# Patient Record
Sex: FEMALE | Race: ASIAN | NOT HISPANIC OR LATINO | Employment: FULL TIME | URBAN - METROPOLITAN AREA
[De-identification: names, ages, dates, MRNs, and addresses within clinical notes are randomized per-mention and may not be internally consistent; named-entity substitution may affect disease eponyms.]

---

## 2017-06-28 ENCOUNTER — TELEPHONE (OUTPATIENT)
Dept: MEDICAL GROUP | Age: 47
End: 2017-06-28

## 2017-06-28 NOTE — TELEPHONE ENCOUNTER
Called Martinez Lopez and left a message to return my call regarding his/her upcoming NP appointment with Jeanne Rivera M.D..   If patient returns the call please transfer them to extension: [phone]

## 2017-06-29 NOTE — TELEPHONE ENCOUNTER
Called Martinez Lopez and left a message to return my call regarding his/her upcoming NP appointment with Jeanne Rivera M.D..   If patient returns the call please transfer them to extension: [phone]

## 2017-07-05 ENCOUNTER — OFFICE VISIT (OUTPATIENT)
Dept: MEDICAL GROUP | Age: 47
End: 2017-07-05
Payer: COMMERCIAL

## 2017-07-05 VITALS
TEMPERATURE: 97.5 F | BODY MASS INDEX: 18.44 KG/M2 | DIASTOLIC BLOOD PRESSURE: 70 MMHG | SYSTOLIC BLOOD PRESSURE: 106 MMHG | HEIGHT: 64 IN | WEIGHT: 108 LBS | HEART RATE: 77 BPM | OXYGEN SATURATION: 95 %

## 2017-07-05 DIAGNOSIS — Z12.31 VISIT FOR SCREENING MAMMOGRAM: ICD-10-CM

## 2017-07-05 DIAGNOSIS — M62.838 TRAPEZIUS MUSCLE SPASM: ICD-10-CM

## 2017-07-05 DIAGNOSIS — H53.10 TIRED EYES: ICD-10-CM

## 2017-07-05 DIAGNOSIS — Z13.220 SCREENING FOR LIPID DISORDERS: ICD-10-CM

## 2017-07-05 PROCEDURE — 99204 OFFICE O/P NEW MOD 45 MIN: CPT | Performed by: INTERNAL MEDICINE

## 2017-07-05 RX ORDER — CYCLOBENZAPRINE HCL 5 MG
5 TABLET ORAL NIGHTLY PRN
Qty: 30 TAB | Refills: 0 | Status: SHIPPED | OUTPATIENT
Start: 2017-07-05 | End: 2020-09-26

## 2017-07-05 ASSESSMENT — PAIN SCALES - GENERAL: PAINLEVEL: 2=MINIMAL-SLIGHT

## 2017-07-05 ASSESSMENT — PATIENT HEALTH QUESTIONNAIRE - PHQ9: CLINICAL INTERPRETATION OF PHQ2 SCORE: 0

## 2017-07-05 NOTE — PROGRESS NOTES
Martinez Lopez is a 47 y.o. female here to establish care and the evaluation and management of:      HPI:    Trapezius muscle spasm  Patient reported tightness on the shoulder blade and back of the shoulders for a few months. She works as a  and always leans forward at work for cooking and prepare food. She reported dull aching pain and muscle tightness after noontime after work. Pain is 5 out of 10 in severity. She has not tried any medication yet.    Tired eyes  Patient reported feeling tired on both eyes. She has eye exam a few months ago and needs to wear reading glasses. She was told by eye doctor that she does not have glaucoma or cataracts. She feels eye tiredness from looking her cell phone. She does not have history of diabetes or history of eye surgery.     Current medicines (including changes today)  Current Outpatient Prescriptions   Medication Sig Dispense Refill   • cyclobenzaprine (FLEXERIL) 5 MG tablet Take 1 Tab by mouth at bedtime as needed. 30 Tab 0     No current facility-administered medications for this visit.     She  has no past medical history of Breast cancer (CMS-[facility]) or Cancer (CMS-[facility]).  She  has past surgical history that includes tubal coagulation laparoscopic bilateral ().  Social History   Substance Use Topics   • Smoking status: Never Smoker    • Smokeless tobacco: Never Used   • Alcohol Use: No     Social History     Social History Narrative     Family History   Problem Relation Age of Onset   • Lung Disease Mother    • Cancer Paternal Uncle 72     Throat cancer    • Cancer Maternal Grandmother 65     Breast cancer     Family Status   Relation Status Death Age   • Mother Alive    • Father Alive    • Sister Alive    • Brother Alive    • Paternal Uncle     • Maternal Grandmother     • Maternal Grandfather     • Paternal Grandmother     • Paternal Grandfather     • Brother Alive    • Brother Alive    • Sister Alive    • Brother Alive   "    Health Maintenance Topics with due status: Overdue       Topic Date Due    IMM DTaP/Tdap/Td Vaccine 02/23/1987    PAP SMEAR 02/23/1989    MAMMOGRAM 09/30/2014         ROS    Gen.: Denied weight change, appetite change, fatigue.  ENT: Denied sinus tenderness, nasal congestion, runny nose, or sore throat  CVS: Denied chest pain, palpitations, legs swelling.  Respiratory: Denied cough, shortness of breath, wheezing.  GI: Denied abdominal pain, constipation or diarrhea.  Endocrine: Denied temperature intolerance, increased frequency of urination, polyphagia or polydipsia.  Musculoskeletal: Denied back pain or joint pain.    All other systems reviewed and are negative     Objective:     Blood pressure 106/70, pulse 77, temperature 36.4 °C (97.5 °F), height 1.613 m (5' 3.5\"), weight 48.988 kg (108 lb), SpO2 95 %, not currently breastfeeding. Body mass index is 18.83 kg/(m^2).  Physical Exam:    Constitutional: Well nourished and Well developed, Alert, no distress.  Skin: Warm, dry, good turgor, no rashes in visible areas.  Eye: Equal, round and reactive, conjunctiva clear, lids normal.  ENMT: Lips without lesions, good dentition, oropharynx clear.  Neck: Trachea midline, no masses, no thyromegaly. No cervical or supraclavicular lymphadenopathy.  Respiratory: Unlabored respiratory effort, lungs clear to auscultation, no wheezes, no ronchi.  Cardiovascular: Normal S1, S2, no murmur, no edema.   Abdomen: Soft, non distended, non-tender, no masses, no hepatosplenomegaly. Bowel sound normal.  Extremities: No edema, no clubbing, no cyanosis.  Psych: Alert and oriented x3, normal affect and mood.  Musculoskeletal exam: Mild tenderness and tightness on Bilateral trapezius muscle and parascapular regions.        Assessment and Plan:   The following treatment plan was discussed       1. Tired eyes  - Discussed to avoid looking small printing. Advised to wear a prescription reading glasses as instructed by her eye doctor.  - " Discussed to try PreserVision supplements for eyes and Refresh lubricant eyedrops for dry eyes.  - CBC WITH DIFFERENTIAL; Future  - COMP METABOLIC PANEL; Future    2. Trapezius muscle spasm  - Has long discussion of possible causes with patient and family. Advised to try stretching exercises on neck, shoulders and arm.  - Advised to try heat therapy and TENS units for massage.  - She can try over the counter low dose ibuprofen 200 mg twice a day when necessary with food.  - Advised patient to stop taking ibuprofen or any NSAIDs if she has abdominal pain, black tarry stool or bloody stool.  - Prescribed Flexeril 5 mg daily at bedtime when necessary for muscle spasm and tightness.  - Advised to be cautious for sedation effect of Flexeril and recommend not to take it with alcohol or other sedating medications. Recommend to avoid driving or operating heavy machinery when she feels drowsy or sleepy from taking medication.  - CBC WITH DIFFERENTIAL; Future  - COMP METABOLIC PANEL; Future  - cyclobenzaprine (FLEXERIL) 5 MG tablet; Take 1 Tab by mouth at bedtime as needed.  Dispense: 30 Tab; Refill: 0    3. Screening for lipid disorders  - Advised to eat low fat, low carbohydrate and high fiber diet as well as do cardio physical exercise regularly.   - LIPID PROFILE; Future    4. Visit for screening mammogram  - Order screening mammogram. Patient stated that her maternal grandmom has breast cancer at age 65. Last mammogram in 2013 was normal.  - MA-SCREEN MAMMO W/CAD-BILAT; Future        Records requested.  Followup: Return in about 6 weeks (around 8/16/2017), or if symptoms worsen or fail to improve, for Pap, lab review. sooner should new symptoms or problems arise.      Please note that this dictation was created using voice recognition software. I have made every reasonable attempt to correct obvious errors, but I expect that there may have unintended errors in text, spelling, punctuation, or grammar that I did not  discover.

## 2017-07-05 NOTE — ASSESSMENT & PLAN NOTE
Patient reported tightness on the shoulder blade and back of the shoulders for a few months. She works as a  and always leans forward at work for cooking and prepare food. She reported dull aching pain and muscle tightness after noontime after work. Pain is 5 out of 10 in severity. She has not tried any medication yet.

## 2017-07-05 NOTE — MR AVS SNAPSHOT
"Martinez Lopez   2017 11:00 AM   Office Visit   MRN: [id number]    Department:  [address] [place] [address]   Dept Phone:  [phone]    Description:  Female : 1970   Provider:  Jeanne Rivera M.D.           Reason for Visit     Establish Care           Allergies as of 2017     No Known Allergies      You were diagnosed with     Tired eyes   [233687]       Trapezius muscle spasm   [[id number]]       Screening for lipid disorders   [1885805]       Visit for screening mammogram   [[id number]]         Vital Signs     Blood Pressure Pulse Temperature Height Weight Body Mass Index    106/70 mmHg 77 36.4 °C (97.5 °F) 1.613 m (5' 3.5\") 48.988 kg (108 lb) 18.83 kg/m2    Oxygen Saturation Breastfeeding? Smoking Status             95% No Never Smoker          Basic Information     Date Of Birth Sex Race Ethnicity Preferred Language    1970 Female Unknown Unknown Chinese      Your appointments     Aug 16, 2017  1:00 PM   Established Patient with Jeanne Rivera M.D.   [address] [facility]  [facility] [id number] [phone]           You will be receiving a confirmation call a few days before your appointment from our automated call confirmation system.              Problem List              ICD-10-CM Priority Class Noted - Resolved    Tired eyes H53.10   2017 - Present    Trapezius muscle spasm M62.838   2017 - Present      Health Maintenance        Date Due Completion Dates    IMM DTaP/Tdap/Td Vaccine (1 - Tdap) 1987 ---    PAP SMEAR 1989 ---    MAMMOGRAM 2014    IMM INFLUENZA (1) 2014, 2011            Current Immunizations     Influenza Vaccine Quad Inj (Pf) 2011    Influenza Vaccine Quad Inj (Preserved) 2014      Below and/or attached are the medications your provider expects you to take. Review all of your home medications and newly ordered medications with your provider and/or pharmacist. Follow " medication instructions as directed by your provider and/or pharmacist. Please keep your medication list with you and share with your provider. Update the information when medications are discontinued, doses are changed, or new medications (including over-the-counter products) are added; and carry medication information at all times in the event of emergency situations     Allergies:  No Known Allergies          Medications  Valid as of: July 05, 2017 - 11:52 AM    Generic Name Brand Name Tablet Size Instructions for use    Cyclobenzaprine HCl (Tab) FLEXERIL 5 MG Take 1 Tab by mouth at bedtime as needed.        .                 Medicines prescribed today were sent to:     [facility] [address] [facility] ([place]), [place] - [address]    [facility] [facility] [facility] [place] [address]    Phone: [phone] Fax: [phone]    Open 24 Hours?: No      Medication refill instructions:       If your prescription bottle indicates you have medication refills left, it is not necessary to call your provider’s office. Please contact your pharmacy and they will refill your medication.    If your prescription bottle indicates you do not have any refills left, you may request refills at any time through one of the following ways: The online [email or web address] system (except Urgent Care), by calling your provider’s office, or by asking your pharmacy to contact your provider’s office with a refill request. Medication refills are processed only during regular business hours and may not be available until the next business day. Your provider may request additional information or to have a follow-up visit with you prior to refilling your medication.   *Please Note: Medication refills are assigned a new Rx number when refilled electronically. Your pharmacy may indicate that no refills were authorized even though a new prescription for the same medication is available at the pharmacy. Please request the medicine by name with the pharmacy before contacting your  provider for a refill.        Your To Do List     Future Labs/Procedures Complete By Expires    CBC WITH DIFFERENTIAL  As directed 7/6/2018    COMP METABOLIC PANEL  As directed 7/6/2018    LIPID PROFILE  As directed 7/6/2018    MA-SCREEN MAMMO W/CAD-BILAT  As directed 8/6/2018         [email or web address] Access Code: [email or web address]  Expires: 8/4/2017 10:22 AM    [email or web address]  A secure, online tool to manage your health information     [employer]’s [email or web address]® is a secure, online tool that connects you to your personalized health information from the privacy of your home -- day or night - making it very easy for you to manage your healthcare. Once the activation process is completed, you can even access your medical information using the [email or web address] anahi, which is available for free in the Apple Anahi store or Google Play store.     [employer] [email or web address] provides the following levels of access (as shown below):   My Chart Features   [facility] Doctor [facility]  Specialists [facility]  Urgent  Care Non-[facility]  Primary Care  Doctor   Email your healthcare team securely and privately 24/7 X X X    Manage appointments: schedule your next appointment; view details of past/upcoming appointments X      Request prescription refills. X      View recent personal medical records, including lab and immunizations X X X X   View health record, including health history, allergies, medications X X X X   Read reports about your outpatient visits, procedures, consult and ER notes X X X X   See your discharge summary, which is a recap of your hospital and/or ER visit that includes your diagnosis, lab results, and care plan. X X       How to register for [email or web address]:  1. Go to  [email or web address].  2. Click on the Sign Up Now box, which takes you to the New Member Sign Up page. You will need to provide the following information:  a. Enter your [employer] [email or web address] Access Code exactly as it appears at the top of this page. (You will not need to use this code after you’ve  completed the sign-up process. If you do not sign up before the expiration date, you must request a new code.)   b. Enter your date of birth.   c. Enter your home email address.   d. Click Submit, and follow the next screen’s instructions.  3. Create a vocaltapt ID. This will be your vocaltapt login ID and cannot be changed, so think of one that is secure and easy to remember.  4. Create a [employer] password. You can change your password at any time.  5. Enter your Password Reset Question and Answer. This can be used at a later time if you forget your password.   6. Enter your e-mail address. This allows you to receive e-mail notifications when new information is available in [employer].  7. Click Sign Up. You can now view your health information.    For assistance activating your [employer] account, call [phone]

## 2017-07-05 NOTE — ASSESSMENT & PLAN NOTE
Patient reported feeling tired on both eyes. She has eye exam a few months ago and needs to wear reading glasses. She was told by eye doctor that she does not have glaucoma or cataracts. She feels eye tiredness from looking her cell phone. She does not have history of diabetes or history of eye surgery.

## 2017-08-05 ENCOUNTER — HOSPITAL ENCOUNTER (OUTPATIENT)
Dept: LAB | Facility: MEDICAL CENTER | Age: 47
End: 2017-08-05
Attending: INTERNAL MEDICINE
Payer: COMMERCIAL

## 2017-08-05 DIAGNOSIS — M62.838 TRAPEZIUS MUSCLE SPASM: ICD-10-CM

## 2017-08-05 DIAGNOSIS — H53.10 TIRED EYES: ICD-10-CM

## 2017-08-05 DIAGNOSIS — Z13.220 SCREENING FOR LIPID DISORDERS: ICD-10-CM

## 2017-08-05 LAB
ALBUMIN SERPL BCP-MCNC: 3.8 G/DL (ref 3.2–4.9)
ALBUMIN/GLOB SERPL: 1.2 G/DL
ALP SERPL-CCNC: 33 U/L (ref 30–99)
ALT SERPL-CCNC: 9 U/L (ref 2–50)
ANION GAP SERPL CALC-SCNC: 5 MMOL/L (ref 0–11.9)
AST SERPL-CCNC: 15 U/L (ref 12–45)
BASOPHILS # BLD AUTO: 0.7 % (ref 0–1.8)
BASOPHILS # BLD: 0.04 K/UL (ref 0–0.12)
BILIRUB SERPL-MCNC: 1.2 MG/DL (ref 0.1–1.5)
BUN SERPL-MCNC: 15 MG/DL (ref 8–22)
CALCIUM SERPL-MCNC: 9.2 MG/DL (ref 8.5–10.5)
CHLORIDE SERPL-SCNC: 104 MMOL/L (ref 96–112)
CHOLEST SERPL-MCNC: 162 MG/DL (ref 100–199)
CO2 SERPL-SCNC: 28 MMOL/L (ref 20–33)
CREAT SERPL-MCNC: 0.61 MG/DL (ref 0.5–1.4)
EOSINOPHIL # BLD AUTO: 0.13 K/UL (ref 0–0.51)
EOSINOPHIL NFR BLD: 2.4 % (ref 0–6.9)
ERYTHROCYTE [DISTWIDTH] IN BLOOD BY AUTOMATED COUNT: 41.2 FL (ref 35.9–50)
GFR SERPL CREATININE-BSD FRML MDRD: >60 ML/MIN/1.73 M 2
GLOBULIN SER CALC-MCNC: 3.2 G/DL (ref 1.9–3.5)
GLUCOSE SERPL-MCNC: 71 MG/DL (ref 65–99)
HCT VFR BLD AUTO: 41.2 % (ref 37–47)
HDLC SERPL-MCNC: 57 MG/DL
HGB BLD-MCNC: 13.4 G/DL (ref 12–16)
IMM GRANULOCYTES # BLD AUTO: 0.03 K/UL (ref 0–0.11)
IMM GRANULOCYTES NFR BLD AUTO: 0.6 % (ref 0–0.9)
LDLC SERPL CALC-MCNC: 93 MG/DL
LYMPHOCYTES # BLD AUTO: 1.74 K/UL (ref 1–4.8)
LYMPHOCYTES NFR BLD: 32.5 % (ref 22–41)
MCH RBC QN AUTO: 30 PG (ref 27–33)
MCHC RBC AUTO-ENTMCNC: 32.5 G/DL (ref 33.6–35)
MCV RBC AUTO: 92.4 FL (ref 81.4–97.8)
MONOCYTES # BLD AUTO: 0.52 K/UL (ref 0–0.85)
MONOCYTES NFR BLD AUTO: 9.7 % (ref 0–13.4)
NEUTROPHILS # BLD AUTO: 2.89 K/UL (ref 2–7.15)
NEUTROPHILS NFR BLD: 54.1 % (ref 44–72)
NRBC # BLD AUTO: 0 K/UL
NRBC BLD AUTO-RTO: 0 /100 WBC
PLATELET # BLD AUTO: 189 K/UL (ref 164–446)
PMV BLD AUTO: 10.3 FL (ref 9–12.9)
POTASSIUM SERPL-SCNC: 3.7 MMOL/L (ref 3.6–5.5)
PROT SERPL-MCNC: 7 G/DL (ref 6–8.2)
RBC # BLD AUTO: 4.46 M/UL (ref 4.2–5.4)
SODIUM SERPL-SCNC: 137 MMOL/L (ref 135–145)
TRIGL SERPL-MCNC: 60 MG/DL (ref 0–149)
WBC # BLD AUTO: 5.4 K/UL (ref 4.8–10.8)

## 2017-08-05 PROCEDURE — 85025 COMPLETE CBC W/AUTO DIFF WBC: CPT

## 2017-08-05 PROCEDURE — 80061 LIPID PANEL: CPT

## 2017-08-05 PROCEDURE — 36415 COLL VENOUS BLD VENIPUNCTURE: CPT

## 2017-08-05 PROCEDURE — 80053 COMPREHEN METABOLIC PANEL: CPT

## 2017-08-15 ENCOUNTER — TELEPHONE (OUTPATIENT)
Dept: MEDICAL GROUP | Age: 47
End: 2017-08-15

## 2017-08-15 NOTE — TELEPHONE ENCOUNTER
ESTABLISHED PATIENT PRE-VISIT PLANNING     Note: Patient will not be contacted if there is no indication to call.     1.  Reviewed notes from the last few office visits within the medical group: Yes    2.  If any orders were placed at last visit or intended to be done for this visit (i.e. 6 mos follow-up), do we have Results/Consult Notes?        •  Labs - Labs ordered, completed and results are in chart.       •  Imaging - Imaging was not ordered at last office visit.       •  Referrals - No referrals were ordered at last office visit.    3. Is this appointment scheduled as a Hospital Follow-Up? No    4.  Immunizations were updated in [employer] using WebIZ?: Yes       •  Web Iz Recommendations: FLU, MMR , TDAP and VARICELLA (Chicken Pox)     5.  Patient is due for the following Health Maintenance Topics:   Health Maintenance Due   Topic Date Due   • IMM DTaP/Tdap/Td Vaccine (1 - Tdap) 02/23/1987   • PAP SMEAR  02/23/1989   • MAMMOGRAM  09/30/2014           6.  Patient was NOT informed to arrive 15 min prior to their scheduled appointment and bring in their medication bottles.

## 2017-08-16 ENCOUNTER — HOSPITAL ENCOUNTER (OUTPATIENT)
Facility: MEDICAL CENTER | Age: 47
End: 2017-08-16
Attending: INTERNAL MEDICINE
Payer: COMMERCIAL

## 2017-08-16 ENCOUNTER — OFFICE VISIT (OUTPATIENT)
Dept: MEDICAL GROUP | Age: 47
End: 2017-08-16
Payer: COMMERCIAL

## 2017-08-16 VITALS
HEART RATE: 89 BPM | OXYGEN SATURATION: 98 % | TEMPERATURE: 97.7 F | HEIGHT: 64 IN | BODY MASS INDEX: 18.78 KG/M2 | WEIGHT: 110 LBS | SYSTOLIC BLOOD PRESSURE: 106 MMHG | DIASTOLIC BLOOD PRESSURE: 68 MMHG

## 2017-08-16 DIAGNOSIS — Z12.4 SCREENING FOR CERVICAL CANCER: ICD-10-CM

## 2017-08-16 DIAGNOSIS — Z01.419 ENCOUNTER FOR GYNECOLOGICAL EXAMINATION: ICD-10-CM

## 2017-08-16 PROCEDURE — 87624 HPV HI-RISK TYP POOLED RSLT: CPT

## 2017-08-16 PROCEDURE — 99396 PREV VISIT EST AGE 40-64: CPT | Performed by: INTERNAL MEDICINE

## 2017-08-16 PROCEDURE — 88175 CYTOPATH C/V AUTO FLUID REDO: CPT

## 2017-08-16 ASSESSMENT — PAIN SCALES - GENERAL: PAINLEVEL: NO PAIN

## 2017-08-16 NOTE — MR AVS SNAPSHOT
"        Martinez John   2017 1:00 PM   Office Visit   MRN: [id number]    Department:  [address] [place] [address]   Dept Phone:  [phone]    Description:  Female : 1970   Provider:  Jeanne Rivera M.D.           Reason for Visit     Gynecologic Exam pap      Allergies as of 2017     No Known Allergies      You were diagnosed with     Encounter for gynecological examination   [[id number]]   Counseled for healthy diet and physical exercise, monthly self breast exam and anual screening mammogram, take vitamin D and calcium daily.    Screening for cervical cancer   [[id number]]   Obtained pap smear and sent to lab. Will advise for pap result.       Vital Signs     Blood Pressure Pulse Temperature Height Weight Body Mass Index    106/68 mmHg 89 36.5 °C (97.7 °F) 1.613 m (5' 3.5\") 49.896 kg (110 lb) 19.18 kg/m2    Oxygen Saturation Breastfeeding? Smoking Status             98% No Never Smoker          Basic Information     Date Of Birth Sex Race Ethnicity Preferred Language    1970 Female  Non- Chinese      Problem List              ICD-10-CM Priority Class Noted - Resolved    Tired eyes H53.10   2017 - Present    Trapezius muscle spasm M62.838   2017 - Present      Health Maintenance        Date Due Completion Dates    IMM DTaP/Tdap/Td Vaccine (1 - Tdap) 1987 ---    PAP SMEAR 1989 ---    MAMMOGRAM 2014    IMM INFLUENZA (1) 2014, 2011            Current Immunizations     Influenza Vaccine Quad Inj (Pf) 2011    Influenza Vaccine Quad Inj (Preserved) 2014      Below and/or attached are the medications your provider expects you to take. Review all of your home medications and newly ordered medications with your provider and/or pharmacist. Follow medication instructions as directed by your provider and/or pharmacist. Please keep your medication list with you and share with your provider. Update the information when medications are " discontinued, doses are changed, or new medications (including over-the-counter products) are added; and carry medication information at all times in the event of emergency situations     Allergies:  No Known Allergies          Medications  Valid as of: August 16, 2017 -  5:02 PM    Generic Name Brand Name Tablet Size Instructions for use    Cyclobenzaprine HCl (Tab) FLEXERIL 5 MG Take 1 Tab by mouth at bedtime as needed.        .                 Medicines prescribed today were sent to:     [address] ([place]), [place] [address] [place] [id number] [facility] [place] [address]    Phone: [phone] Fax: [phone]    Open 24 Hours?: No      Medication refill instructions:       If your prescription bottle indicates you have medication refills left, it is not necessary to call your provider’s office. Please contact your pharmacy and they will refill your medication.    If your prescription bottle indicates you do not have any refills left, you may request refills at any time through one of the following ways: The online [employer] [place] system (except Urgent Care), by calling your provider’s office, or by asking your pharmacy to contact your provider’s office with a refill request. Medication refills are processed only during regular business hours and may not be available until the next business day. Your provider may request additional information or to have a follow-up visit with you prior to refilling your medication.   *Please Note: Medication refills are assigned a new Rx number when refilled electronically. Your pharmacy may indicate that no refills were authorized even though a new prescription for the same medication is available at the pharmacy. Please request the medicine by name with the pharmacy before contacting your provider for a refill.        Your To Do List     Future Labs/Procedures Complete By Expires    THINPREP PAP WITH HPV  As directed 8/17/2018         [employer] [place] Access Code: Activation code  not generated  Current MyChart Status: Active

## 2017-08-16 NOTE — PROGRESS NOTES
SUBJECTIVE: 47 y.o. female for annual routine gynecologic exam  Chief Complaint   Patient presents with   • Gynecologic Exam     pap       Obstetric History     No data available      Last Pap: 3-5 years ago.  History   Sexual Activity   • Sexual Activity: Not on file     Sexual history: currently sexually active, single partner   H/O Abnormal Pap no  She  reports that she has never smoked. She has never used smokeless tobacco.        Allergies: Review of patient's allergies indicates no known allergies.     ROS:    Reports none menopause symptoms of hot flashes, night sweats, sleep disruption, mood changes.Denies vaginal dryness.   Menses every month with 4 days light bleeding.  Cramping is none.   She does not take OTC analgesics for cramping  No significant bloating/fluid retention, pelvic pain, or dyspareunia. No vaginal discharge   No breast tenderness, mass, nipple discharge, changes in size or contour, or abnormal cyclic discomfort.  No urinary tract symptoms, no incontinence.   No abdominal pain, change in bowel habits, black or bloody stools.    No unusual headaches, no visual changes, menstrual migraines   No prolonged cough. No dyspnea or chest pain on exertion.  No depression, labile mood, anxiety, libido changes, insomnia.  No polydipsia, polyuria, temperature intolerance.  No new/concerning skin lesions, concerns.     Exercise: no regular exercise program  Preventive Care: Mammogram was ordered on 7/5/17 and she has not done it yet.     Current medicines (including changes today)  Current Outpatient Prescriptions   Medication Sig Dispense Refill   • cyclobenzaprine (FLEXERIL) 5 MG tablet Take 1 Tab by mouth at bedtime as needed. 30 Tab 0     No current facility-administered medications for this visit.     She  has no past medical history of Breast cancer (CMS-[facility]) or Cancer (CMS-[facility]).  She  has past surgical history that includes tubal coagulation laparoscopic bilateral (1994).     Family History:  "  Family History   Problem Relation Age of Onset   • Lung Disease Mother    • Cancer Paternal Uncle 72     Throat cancer    • Cancer Maternal Grandmother 65     Breast cancer          OBJECTIVE:   /68 mmHg  Pulse 89  Temp(Src) 36.5 °C (97.7 °F)  Ht 1.613 m (5' 3.5\")  Wt 49.896 kg (110 lb)  BMI 19.18 kg/m2  SpO2 98%  Breastfeeding? No  Body mass index is 19.18 kg/(m^2).    HEAD AND NECK:  Ears normal.  Throat, oral cavity and tongue normal.  Neck supple. No adenopathy or masses in the neck or supraclavicular regions.  No carotid bruits. No thyromegaly. NEURO: Cranial nerves are normal. DTR's normal and symmetric.  CHEST:  Clear, good air entry, no wheezes or rales. HEART:  Regular rate and rhythm.  S1 and S2 normal. No edema or JVD. ABDOMEN:  Soft without tenderness, guarding, mass or organomegaly.  No CVA tenderness or inguinal adenopathy. EXTREMITIES:  Extremities, reflexes and peripheral pulses are normal. SKIN: color normal, vascularity normal, no edema, temperature normal   No rashes or suspicious skin lesions noted.     Breast Exam: Performed with instruction during examination. No axillary lymphadenopathy, no skin changes, no dominant masses. No nipple retraction  Pelvic Exam -  Normal external genitalia with no lesions. Normal vaginal mucosa with normal rugation and no discharge. Cervix with no visible lesions. No cervical motion tenderness. Uterus is normal sized with no masses. No adnexal tenderness or enlargement appreciated. Thin Prep Pap is obtained, vaginal swab is not obtained and specimen(s) sent to lab  Rectal: deferred    Reviewed lab with patient and family today.    Results for CATRINA VALLEJO (MRN [id number]) as of 8/16/2017 16:57   Ref. Range 8/5/2017 08:49   WBC Latest Ref Range: 4.8-10.8 K/uL 5.4   RBC Latest Ref Range: 4.20-5.40 M/uL 4.46   Hemoglobin Latest Ref Range: 12.0-16.0 g/dL 13.4   Hematocrit Latest Ref Range: 37.0-47.0 % 41.2   MCV Latest Ref Range: 81.4-97.8 fL 92.4   MCH " Latest Ref Range: 27.0-33.0 pg 30.0   MCHC Latest Ref Range: 33.6-35.0 g/dL 32.5 (L)   RDW Latest Ref Range: 35.9-50.0 fL 41.2   Platelet Count Latest Ref Range: 164-446 K/uL 189   MPV Latest Ref Range: 9.0-12.9 fL 10.3   Neutrophils-Polys Latest Ref Range: 44.00-72.00 % 54.10   Neutrophils (Absolute) Latest Ref Range: 2.00-7.15 K/uL 2.89   Lymphocytes Latest Ref Range: 22.00-41.00 % 32.50   Lymphs (Absolute) Latest Ref Range: 1.00-4.80 K/uL 1.74   Monocytes Latest Ref Range: 0.00-13.40 % 9.70   Monos (Absolute) Latest Ref Range: 0.00-0.85 K/uL 0.52   Eosinophils Latest Ref Range: 0.00-6.90 % 2.40   Eos (Absolute) Latest Ref Range: 0.00-0.51 K/uL 0.13   Basophils Latest Ref Range: 0.00-1.80 % 0.70   Baso (Absolute) Latest Ref Range: 0.00-0.12 K/uL 0.04   Immature Granulocytes Latest Ref Range: 0.00-0.90 % 0.60   Immature Granulocytes (abs) Latest Ref Range: 0.00-0.11 K/uL 0.03   Nucleated RBC Latest Units: /100 WBC 0.00   NRBC (Absolute) Latest Units: K/uL 0.00   Sodium Latest Ref Range: 135-145 mmol/L 137   Potassium Latest Ref Range: 3.6-5.5 mmol/L 3.7   Chloride Latest Ref Range:  mmol/L 104   Co2 Latest Ref Range: 20-33 mmol/L 28   Anion Gap Latest Ref Range: 0.0-11.9  5.0   Glucose Latest Ref Range: 65-99 mg/dL 71   Bun Latest Ref Range: 8-22 mg/dL 15   Creatinine Latest Ref Range: 0.50-1.40 mg/dL 0.61   GFR If  Latest Ref Range: >60 mL/min/1.73 m 2 >60   GFR If Non  Latest Ref Range: >60 mL/min/1.73 m 2 >60   Calcium Latest Ref Range: 8.5-10.5 mg/dL 9.2   AST(SGOT) Latest Ref Range: 12-45 U/L 15   ALT(SGPT) Latest Ref Range: 2-50 U/L 9   Alkaline Phosphatase Latest Ref Range: 30-99 U/L 33   Total Bilirubin Latest Ref Range: 0.1-1.5 mg/dL 1.2   Albumin Latest Ref Range: 3.2-4.9 g/dL 3.8   Total Protein Latest Ref Range: 6.0-8.2 g/dL 7.0   Globulin Latest Ref Range: 1.9-3.5 g/dL 3.2   A-G Ratio Latest Units: g/dL 1.2   Cholesterol,Tot Latest Ref Range: 100-199 mg/dL 162    Triglycerides Latest Ref Range: 0-149 mg/dL 60   HDL Latest Ref Range: >=40 mg/dL 57   LDL Latest Ref Range: <100 mg/dL 93         <ASSESSMENT and PLAN>  1. Encounter for gynecological examination      Counseled for healthy diet and physical exercise, monthly self breast exam and anual screening mammogram, take vitamin D and calcium daily.   2. Screening for cervical cancer  THINPREP PAP WITH HPV    Obtained pap smear and sent to lab. Will advise for pap result.        Discussed  breast self exam, mammography screening, adequate intake of calcium and vitamin D, diet and exercise   Follow-up in 1 years for next Gyn exam and 3 years for next Pap.   Next office visit for recheck of chronic medical conditions is due in  1 year.

## 2017-08-18 LAB
CYTOLOGY REG CYTOL: NORMAL
HPV HR 12 DNA CVX QL NAA+PROBE: NEGATIVE
HPV16 DNA SPEC QL NAA+PROBE: NEGATIVE
HPV18 DNA SPEC QL NAA+PROBE: NEGATIVE
SPECIMEN SOURCE: NORMAL

## 2017-08-21 ENCOUNTER — TELEPHONE (OUTPATIENT)
Dept: MEDICAL GROUP | Age: 47
End: 2017-08-21

## 2017-08-21 NOTE — Clinical Note
August 23, 2017        Martinez Lopez  [address] [facility] [address]        Dear Martinez:    Dr. Rivera's office has been unable to reach you. Please call our office at [phone]. Thank you.      If you have any questions or concerns, please don't hesitate to call.        Sincerely,        Pato Trevizo Ass't      Electronically Signed

## 2017-08-21 NOTE — TELEPHONE ENCOUNTER
Phone Number Called: [phone] (home)     Message: Tried to call patient's son, got a busy tone. Will try again later.    Left Message for patient to call back: N\A

## 2017-08-21 NOTE — TELEPHONE ENCOUNTER
----- Message from Jeanne Rivera M.D. sent at 8/18/2017 10:56 PM PDT -----  Addendum to previous result note. Patient has normal pap smear and negative for HPV, but we did not do gonorrhea and chlamydia screening tests as she is low risk for STD. Please delete the last sentence of previous comment on result note.     Thanks!  Jeanne Rivera M.D.

## 2017-08-22 NOTE — TELEPHONE ENCOUNTER
Phone Number Called: [phone] (home)     Message: Left message for the patient to call us back regarding the note below.    Left Message for patient to call back: yes

## 2017-08-23 NOTE — TELEPHONE ENCOUNTER
Phone Number Called: [phone] (home)     Message: Left message for the patient to call us back regarding the note below. 3rd attempt. Letter mailed to the pt.    Left Message for patient to call back: yes

## 2020-05-20 ENCOUNTER — HOSPITAL ENCOUNTER (OUTPATIENT)
Facility: MEDICAL CENTER | Age: 50
End: 2020-05-20
Payer: COMMERCIAL

## 2020-05-20 LAB — AMBIGUOUS DTTM AMBI4: NORMAL

## 2020-05-23 LAB
SARS-COV-2 RNA SPEC QL NAA+PROBE: NOT DETECTED
SPECIMEN SOURCE: NORMAL

## 2020-09-26 ENCOUNTER — OFFICE VISIT (OUTPATIENT)
Dept: URGENT CARE | Facility: CLINIC | Age: 50
End: 2020-09-26
Payer: COMMERCIAL

## 2020-09-26 ENCOUNTER — HOSPITAL ENCOUNTER (EMERGENCY)
Facility: MEDICAL CENTER | Age: 50
End: 2020-09-26
Payer: COMMERCIAL

## 2020-09-26 VITALS
OXYGEN SATURATION: 96 % | WEIGHT: 103 LBS | HEIGHT: 64 IN | SYSTOLIC BLOOD PRESSURE: 110 MMHG | RESPIRATION RATE: 12 BRPM | BODY MASS INDEX: 17.58 KG/M2 | DIASTOLIC BLOOD PRESSURE: 70 MMHG | HEART RATE: 56 BPM | TEMPERATURE: 98.2 F

## 2020-09-26 VITALS — BODY MASS INDEX: 17.31 KG/M2 | WEIGHT: 101.41 LBS | HEIGHT: 64 IN

## 2020-09-26 DIAGNOSIS — M79.602 LEFT ARM PAIN: ICD-10-CM

## 2020-09-26 DIAGNOSIS — R04.2 HEMOPTYSIS: ICD-10-CM

## 2020-09-26 PROCEDURE — 99203 OFFICE O/P NEW LOW 30 MIN: CPT | Performed by: FAMILY MEDICINE

## 2020-09-26 PROCEDURE — 93000 ELECTROCARDIOGRAM COMPLETE: CPT | Performed by: FAMILY MEDICINE

## 2020-09-26 PROCEDURE — 302449 STATCHG TRIAGE ONLY (STATISTIC)

## 2020-09-26 NOTE — PROGRESS NOTES
"Subjective:      Martinez Lopez is a 50 y.o. female who presents with Arm Pain (Left arm pain, unknown cause, maybe due to repetitive use.) and Cough (Coughing up blood, with bright red, pure blood. x 2 days ago. Last incident was in July 2020. )            This is a new problem.  50-year-old here with her daughter for evaluation of couple of couple things.  She has had left-sided arm pain over the course of the past week without any injury.  She denies any chest pain or shortness of breath.  No paresthesia reported.  No radiculopathy.  She also has had multiple episodes of bright red blood coughing up in the past few days.  She has no history of smoking.  No night sweats or weight loss reported.  Otherwise healthy, no cardiac problems in the past.  No history of stroke or DVT.      Review of Systems   All other systems reviewed and are negative.         Objective:     /70 (BP Location: Right arm, Patient Position: Sitting, BP Cuff Size: Adult)   Pulse (!) 56   Temp 36.8 °C (98.2 °F) (Temporal)   Resp 12   Ht 1.626 m (5' 4\")   Wt 46.7 kg (103 lb)   SpO2 96%   BMI 17.68 kg/m²      Physical Exam  Constitutional:       General: She is not in acute distress.     Appearance: She is not ill-appearing, toxic-appearing or diaphoretic.   HENT:      Head: Normocephalic and atraumatic.      Right Ear: External ear normal.      Left Ear: External ear normal.      Nose: Nose normal.   Eyes:      Conjunctiva/sclera: Conjunctivae normal.   Cardiovascular:      Rate and Rhythm: Regular rhythm. Bradycardia present.      Heart sounds: No murmur. No friction rub. No gallop.    Pulmonary:      Effort: Pulmonary effort is normal. No respiratory distress.      Breath sounds: No stridor. No wheezing, rhonchi or rales.   Chest:      Chest wall: No tenderness.   Abdominal:      General: Abdomen is flat. Bowel sounds are normal. There is no distension.      Palpations: There is no mass.      Tenderness: There is no abdominal " tenderness. There is no guarding or rebound.      Hernia: No hernia is present.   Musculoskeletal:      Left elbow: She exhibits normal range of motion, no swelling, no effusion, no deformity and no laceration. Tenderness found. Lateral epicondyle tenderness noted. No radial head, no medial epicondyle and no olecranon process tenderness noted.   Skin:     General: Skin is warm.      Coloration: Skin is not jaundiced or pale.      Findings: No erythema or rash.   Neurological:      Mental Status: She is alert and oriented to person, place, and time.   Psychiatric:         Mood and Affect: Mood normal.          EKG shows normal sinus rhythm with no acute ST or T changes.     Assessment/Plan:       ASSESSMENT:PLAN:  1. Hemoptysis    2. Left arm pain  - EKG - Clinic Performed  Likely lateral epicondylitis but given her other symptoms would recommend further evaluation in the ED setting as cardiac causes also a remote possibility besides the fact that her hemoptysis is a concern.  She was advised to go to [facility] emergency department for further evaluation.

## 2020-09-26 NOTE — PATIENT INSTRUCTIONS
Please go to the nearest emergency department of your choice for further evaluation.    Hemoptysis  Hemoptysis means coughing up blood from your airways or lungs. The most common cause of hemoptysis is the least serious. It is usually a ruptured small blood vessel caused by coughing or an infection. In some cases, the cause of hemoptysis is not known. Hemoptysis may also be a sign of a more serious problem, such as cancer, pneumonia, a blood clot, or other types of lung disease. You should always contact a caregiver if you develop hemoptysis. This is important, as even mild cases of hemoptysis may lead to serious breathing problems. Major bleeding from the airway is considered a medical emergency, and needs to be evaluated and managed promptly to avoid complications, disability, or death.  Your caregiver may perform tests to find out if the bleeding is coming from your lungs. Some of these tests may include:  · A chest X-ray.   · A computerized X-ray scan (CT scan or CAT scan).   · Bronchoscopy. This test uses a flexible tube (a bronchoscope) to see inside the lungs.   TREATMENT   · Treatment for hemoptysis depends on the cause. It also depends on the quantity of blood. Infrequent, mild hemoptysis usually does not require specific, immediate treatment.   · If the cause of hemoptysis is unknown, treatment may involve monitoring for at least 2 or 3 years. If you have a normal chest X-ray and bronchoscopy, the hemoptysis usually clears within 6 months.   SEEK MEDICAL CARE IF:   · For follow-up care as directed.   · If your symptoms are not improving or are getting worse.   · If you have any other questions or concerns.   SEEK IMMEDIATE MEDICAL CARE IF:   · You begin to cough up large amounts of blood.   · You develop problems with your breathing.   · You begin vomiting blood or see blood in your stool.   · You develop chest pain.   · You feel faint or pass out.   · You develop a fever over 102° F (38.9° C), or as your  caregiver suggests.   Document Released: 01/25/2006 Document Revised: 03/11/2013 Document Reviewed: 05/03/2011  ExitCare® Patient Information ©2013 "[employer].

## 2020-10-15 ENCOUNTER — HOSPITAL ENCOUNTER (OUTPATIENT)
Dept: RADIOLOGY | Facility: MEDICAL CENTER | Age: 50
End: 2020-10-15
Attending: INTERNAL MEDICINE
Payer: COMMERCIAL

## 2020-10-15 ENCOUNTER — OFFICE VISIT (OUTPATIENT)
Dept: MEDICAL GROUP | Facility: MEDICAL CENTER | Age: 50
End: 2020-10-15
Payer: COMMERCIAL

## 2020-10-15 ENCOUNTER — HOSPITAL ENCOUNTER (OUTPATIENT)
Dept: LAB | Facility: MEDICAL CENTER | Age: 50
End: 2020-10-15
Attending: INTERNAL MEDICINE
Payer: COMMERCIAL

## 2020-10-15 VITALS
WEIGHT: 104.06 LBS | RESPIRATION RATE: 16 BRPM | DIASTOLIC BLOOD PRESSURE: 82 MMHG | HEIGHT: 64 IN | SYSTOLIC BLOOD PRESSURE: 112 MMHG | HEART RATE: 53 BPM | OXYGEN SATURATION: 98 % | BODY MASS INDEX: 17.77 KG/M2 | TEMPERATURE: 96.8 F

## 2020-10-15 DIAGNOSIS — R04.2 HEMOPTYSIS: ICD-10-CM

## 2020-10-15 DIAGNOSIS — Z23 NEED FOR VACCINATION: ICD-10-CM

## 2020-10-15 DIAGNOSIS — R93.89 ABNORMAL CHEST X-RAY: ICD-10-CM

## 2020-10-15 DIAGNOSIS — Z00.00 ENCOUNTER FOR PREVENTIVE CARE: ICD-10-CM

## 2020-10-15 DIAGNOSIS — M25.522 ELBOW PAIN, LEFT: ICD-10-CM

## 2020-10-15 DIAGNOSIS — K92.0 HEMATEMESIS WITHOUT NAUSEA: ICD-10-CM

## 2020-10-15 DIAGNOSIS — Z12.31 ENCOUNTER FOR SCREENING MAMMOGRAM FOR BREAST CANCER: ICD-10-CM

## 2020-10-15 DIAGNOSIS — Z12.11 SCREENING FOR COLON CANCER: ICD-10-CM

## 2020-10-15 LAB
APPEARANCE UR: CLEAR
BACTERIA #/AREA URNS HPF: ABNORMAL /HPF
BASOPHILS # BLD AUTO: 1.3 % (ref 0–1.8)
BASOPHILS # BLD: 0.05 K/UL (ref 0–0.12)
BILIRUB UR QL STRIP.AUTO: NEGATIVE
COLOR UR: YELLOW
EOSINOPHIL # BLD AUTO: 0.12 K/UL (ref 0–0.51)
EOSINOPHIL NFR BLD: 3.2 % (ref 0–6.9)
EPI CELLS #/AREA URNS HPF: ABNORMAL /HPF
ERYTHROCYTE [DISTWIDTH] IN BLOOD BY AUTOMATED COUNT: 42.7 FL (ref 35.9–50)
GLUCOSE UR STRIP.AUTO-MCNC: NEGATIVE MG/DL
HCT VFR BLD AUTO: 42.7 % (ref 37–47)
HGB BLD-MCNC: 13.7 G/DL (ref 12–16)
HYALINE CASTS #/AREA URNS LPF: ABNORMAL /LPF
IMM GRANULOCYTES # BLD AUTO: 0 K/UL (ref 0–0.11)
IMM GRANULOCYTES NFR BLD AUTO: 0 % (ref 0–0.9)
INR PPP: 1.08 (ref 0.87–1.13)
KETONES UR STRIP.AUTO-MCNC: NEGATIVE MG/DL
LEUKOCYTE ESTERASE UR QL STRIP.AUTO: ABNORMAL
LYMPHOCYTES # BLD AUTO: 1.39 K/UL (ref 1–4.8)
LYMPHOCYTES NFR BLD: 36.6 % (ref 22–41)
MCH RBC QN AUTO: 30.4 PG (ref 27–33)
MCHC RBC AUTO-ENTMCNC: 32.1 G/DL (ref 33.6–35)
MCV RBC AUTO: 94.7 FL (ref 81.4–97.8)
MICRO URNS: ABNORMAL
MONOCYTES # BLD AUTO: 0.34 K/UL (ref 0–0.85)
MONOCYTES NFR BLD AUTO: 8.9 % (ref 0–13.4)
NEUTROPHILS # BLD AUTO: 1.9 K/UL (ref 2–7.15)
NEUTROPHILS NFR BLD: 50 % (ref 44–72)
NITRITE UR QL STRIP.AUTO: NEGATIVE
NRBC # BLD AUTO: 0 K/UL
NRBC BLD-RTO: 0 /100 WBC
PH UR STRIP.AUTO: 7 [PH] (ref 5–8)
PLATELET # BLD AUTO: 202 K/UL (ref 164–446)
PMV BLD AUTO: 10.6 FL (ref 9–12.9)
PROT UR QL STRIP: NEGATIVE MG/DL
PROTHROMBIN TIME: 14.3 SEC (ref 12–14.6)
RBC # BLD AUTO: 4.51 M/UL (ref 4.2–5.4)
RBC # URNS HPF: ABNORMAL /HPF
RBC UR QL AUTO: NEGATIVE
SP GR UR STRIP.AUTO: 1.01
UROBILINOGEN UR STRIP.AUTO-MCNC: 0.2 MG/DL
WBC # BLD AUTO: 3.8 K/UL (ref 4.8–10.8)
WBC #/AREA URNS HPF: ABNORMAL /HPF

## 2020-10-15 PROCEDURE — 84443 ASSAY THYROID STIM HORMONE: CPT

## 2020-10-15 PROCEDURE — 90686 IIV4 VACC NO PRSV 0.5 ML IM: CPT | Performed by: INTERNAL MEDICINE

## 2020-10-15 PROCEDURE — 85610 PROTHROMBIN TIME: CPT

## 2020-10-15 PROCEDURE — 81001 URINALYSIS AUTO W/SCOPE: CPT

## 2020-10-15 PROCEDURE — 90471 IMMUNIZATION ADMIN: CPT | Performed by: INTERNAL MEDICINE

## 2020-10-15 PROCEDURE — 85025 COMPLETE CBC W/AUTO DIFF WBC: CPT

## 2020-10-15 PROCEDURE — 99204 OFFICE O/P NEW MOD 45 MIN: CPT | Mod: 25 | Performed by: INTERNAL MEDICINE

## 2020-10-15 PROCEDURE — 36415 COLL VENOUS BLD VENIPUNCTURE: CPT

## 2020-10-15 PROCEDURE — 84145 PROCALCITONIN (PCT): CPT

## 2020-10-15 PROCEDURE — 82306 VITAMIN D 25 HYDROXY: CPT

## 2020-10-15 PROCEDURE — 71046 X-RAY EXAM CHEST 2 VIEWS: CPT

## 2020-10-15 PROCEDURE — 80053 COMPREHEN METABOLIC PANEL: CPT

## 2020-10-15 PROCEDURE — 80061 LIPID PANEL: CPT

## 2020-10-15 ASSESSMENT — PATIENT HEALTH QUESTIONNAIRE - PHQ9: CLINICAL INTERPRETATION OF PHQ2 SCORE: 0

## 2020-10-15 NOTE — ASSESSMENT & PLAN NOTE
Left lateral elbow pain  Work as  for ten years  Trial of tennis elbow PT exercises  Tylenol prn  Elbow brace   Icing prn

## 2020-10-15 NOTE — ASSESSMENT & PLAN NOTE
Occasionally hematemesis without nausea since 2016 intermittently  Never had colonoscopy  Denies fever, chills, night sweats, cough, SOB, weight changes, nausea, vomiting, heart burn, dysphagia, abdominal pain, melena, hematochezia, dysuria, hematuria

## 2020-10-15 NOTE — PROGRESS NOTES
New Patient to Establish    Reason to establish: New patient to establish    Martinez Lopez is a 50 y.o. 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  Her mom has hx of lung disease  Denies TB exposure  Denies fever, chills, night sweats, cough, SOB, weight changes, nausea, vomiting, heart burn, dysphagia, abdominal pain, melena, hematochezia, dysuria, hematuria      Hematemesis without nausea  Occasionally hematemesis without nausea since 2016 intermittently, most recently once in July and Sept 2020 respectively   sometimes she vomited some bright red blood, sometimes vomit bright red blood in small amounts  Never had colonoscopy  Denies fever, chills, night sweats, cough, SOB, weight changes, nausea, vomiting, heart burn, dysphagia, abdominal pain, melena, hematochezia, dysuria, hematuria      Elbow pain, left  Left lateral elbow pain  Work as  for ten years  Trial of tennis elbow PT exercises  Tylenol prn  Elbow brace   Icing prn  Avoid NSAIDs for now due to her hemoptysis and hematemesis          No current outpatient medications on file.    Allergies, past medical history, past surgical history, medications, family history, social history reviewed and updated.    ROS     Constitutional: Denies fevers or chills  Eyes: Denies changes in vision  Ears/Nose/Throat/Mouth: Denies nasal congestion or sore throat   Cardiovascular: Denies chest pain or palpitations   Respiratory: Denies shortness of breath , Denies cough  Gastrointestinal/Hepatic: Denies abd pain, nausea, vomiting   Genitourinary:  "Denies dysuria or frequency  Musculoskeletal/Rheum: Denies joint pain and swelling   Neurological: Denies headache  Psychiatric: Denies mood disorder   Endocrine: Denies hx of diabetes or thyroid dysfunction  Heme/Oncology/Lymph Nodes: Denies weight changes or enlarged LNs.    Physical Exam  /82 (BP Location: Left arm, Patient Position: Sitting, BP Cuff Size: Adult)   Pulse (!) 53   Temp 36 °C (96.8 °F) (Temporal)   Resp 16   Ht 1.626 m (5' 4\")   Wt 47.2 kg (104 lb 0.9 oz)   SpO2 98%   BMI 17.86 kg/m²   General: Normal appearance.  Well developed, well nourished, no acute distress.  HEENT: Normocephalic.  Extraocular motion intact. Pupils are equally round, reactive to light and accommodation, conjunctiva clear, no scleral icterus.  Ears: normal shape and contour, ear canals clear, tympanic membranes intact. Hearing intact.  Oropharynx clear, no erythema, edema or exudate noted.  NECK: Thyroid is not enlarged. No JVD.  No carotid bruits. No masses.  Cardiovascular: Regular rhythm and rate. No murmur/rubs/gallops.   Respiratory: Normal respiratory effort, clear to auscultation bilaterally. No wheezing/rales/rhonchi.    Abdomen: Bowel sounds present, soft, nontender, nondistended, no rebound, no guarding.   : No suprapubic tenderness. No CVA tenderness.   EXT: no LE edema b/l. No cyanosis.  No clubbing.  Lymph: No cervical, supraclavicular or axillary lymph nodes are palpable  Skin: Warm and dry.  No suspicious lesions or rashes.   Neurologic: No focal deficits.    Psych: AAOx3,  Normal mood and affect, normal judgment and insight, memory within normal limits      Assessment and Plan    1. Hemoptysis  - REFERRAL TO PULMONARY AND SLEEP MEDICINE General Pulmonary  - CBC WITH DIFFERENTIAL; Future  - Comp Metabolic Panel; Future  - Prothrombin Time; Future  - DX-CHEST-2 VIEWS; Future  Seek medical attention immediately such as going to ER if any worsening hemoptysis    2. Hematemesis without nausea  - " REFERRAL TO GASTROENTEROLOGY  - CBC WITH DIFFERENTIAL; Future  - Comp Metabolic Panel; Future  - Prothrombin Time; Future  Seek medical attention immediately such as going to ER if any worsening hematemesis    3. Elbow pain, left  - REFERRAL TO PHYSIATRY (PMR)    4. Need for vaccination  - Influenza Vaccine Quad Injection (PF)    5. Screening for colon cancer  - REFERRAL TO GASTROENTEROLOGY    6. Encounter for screening mammogram for breast cancer  - MA-SCREENING MAMMO BILAT W/TOMOSYNTHESIS W/CAD; Future    7. Encounter for preventive care  - Lipid Profile; Future  - TSH WITH REFLEX TO FT4; Future  - VITAMIN D,25 HYDROXY; Future  - URINALYSIS; Future    She will also schedule with one of our female provider for her woman's wellness exam including pap smear    Follow-up:Return if symptoms worsen or fail to improve.    This note was created using voice recognition software. There may be unintended errors in spelling, grammar or content.

## 2020-10-15 NOTE — ASSESSMENT & PLAN NOTE
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

## 2020-10-16 ENCOUNTER — HOSPITAL ENCOUNTER (OUTPATIENT)
Dept: RADIOLOGY | Facility: MEDICAL CENTER | Age: 50
End: 2020-10-16
Attending: INTERNAL MEDICINE
Payer: COMMERCIAL

## 2020-10-16 DIAGNOSIS — Z12.31 ENCOUNTER FOR SCREENING MAMMOGRAM FOR BREAST CANCER: ICD-10-CM

## 2020-10-16 DIAGNOSIS — Z01.419 ENCOUNTER FOR WELL WOMAN EXAM WITH ROUTINE GYNECOLOGICAL EXAM: ICD-10-CM

## 2020-10-16 LAB
25(OH)D3 SERPL-MCNC: 28 NG/ML (ref 30–100)
ALBUMIN SERPL BCP-MCNC: 4.5 G/DL (ref 3.2–4.9)
ALBUMIN/GLOB SERPL: 1.5 G/DL
ALP SERPL-CCNC: 44 U/L (ref 30–99)
ALT SERPL-CCNC: 18 U/L (ref 2–50)
ANION GAP SERPL CALC-SCNC: 5 MMOL/L (ref 7–16)
AST SERPL-CCNC: 23 U/L (ref 12–45)
BILIRUB SERPL-MCNC: 0.8 MG/DL (ref 0.1–1.5)
BUN SERPL-MCNC: 15 MG/DL (ref 8–22)
CALCIUM SERPL-MCNC: 9.3 MG/DL (ref 8.5–10.5)
CHLORIDE SERPL-SCNC: 105 MMOL/L (ref 96–112)
CHOLEST SERPL-MCNC: 189 MG/DL (ref 100–199)
CO2 SERPL-SCNC: 32 MMOL/L (ref 20–33)
CREAT SERPL-MCNC: 0.41 MG/DL (ref 0.5–1.4)
FASTING STATUS PATIENT QL REPORTED: NORMAL
GLOBULIN SER CALC-MCNC: 3.1 G/DL (ref 1.9–3.5)
GLUCOSE SERPL-MCNC: 82 MG/DL (ref 65–99)
HDLC SERPL-MCNC: 74 MG/DL
LDLC SERPL CALC-MCNC: 103 MG/DL
POTASSIUM SERPL-SCNC: 4.1 MMOL/L (ref 3.6–5.5)
PROCALCITONIN SERPL-MCNC: <0.05 NG/ML
PROT SERPL-MCNC: 7.6 G/DL (ref 6–8.2)
SODIUM SERPL-SCNC: 142 MMOL/L (ref 135–145)
TRIGL SERPL-MCNC: 58 MG/DL (ref 0–149)
TSH SERPL DL<=0.005 MIU/L-ACNC: 1.78 UIU/ML (ref 0.38–5.33)

## 2020-10-16 PROCEDURE — 77067 SCR MAMMO BI INCL CAD: CPT

## 2020-10-16 NOTE — PROGRESS NOTES
10/15/2020 10:17 AM     HISTORY/REASON FOR EXAM:  hemoptysis.        TECHNIQUE/EXAM DESCRIPTION AND NUMBER OF VIEWS:  Two views of the chest.     COMPARISON:  None.     FINDINGS:  The heart is normal in size.  Linear and irregular shaped opacifications are identified in each lung. These are most extensive and prominent in the lingular segment left upper pulmonary lobe region. No consolidations are identified.  No pleural effusions are appreciated.        IMPRESSION:     1.  Pulmonary opacifications are identified which are most prominent in the lingular segment left upper pulmonary lobe. These could be due to fibrosis. However, pneumonitis or pneumonia is also possible.     2.  No focal consolidations are noted.        Hemoptysis  -     CT-CHEST (THORAX) WITH; Future  -     PROCALCITONIN; Future    Abnormal chest x-ray  -     CT-CHEST (THORAX) WITH; Future  -     PROCALCITONIN; Future

## 2020-10-20 ENCOUNTER — HOSPITAL ENCOUNTER (OUTPATIENT)
Dept: RADIOLOGY | Facility: MEDICAL CENTER | Age: 50
End: 2020-10-20
Attending: INTERNAL MEDICINE
Payer: COMMERCIAL

## 2020-10-20 DIAGNOSIS — R04.2 HEMOPTYSIS: ICD-10-CM

## 2020-10-20 DIAGNOSIS — R93.89 ABNORMAL CHEST X-RAY: ICD-10-CM

## 2020-10-20 DIAGNOSIS — R93.89 ABNORMAL CHEST CT: ICD-10-CM

## 2020-10-20 PROCEDURE — 71260 CT THORAX DX C+: CPT

## 2020-10-20 PROCEDURE — 700117 HCHG RX CONTRAST REV CODE 255: Performed by: INTERNAL MEDICINE

## 2020-10-20 RX ADMIN — IOHEXOL 100 ML: 350 INJECTION, SOLUTION INTRAVENOUS at 10:00

## 2020-10-20 NOTE — PROGRESS NOTES
Hemoptysis  -     QuantiFERON-TB Gold Plus    Abnormal chest CT  -     QuantiFERON-TB Gold Plus         Ref. Range 10/15/2020 11:02   WBC Latest Ref Range: 4.8 - 10.8 K/uL 3.8 (L)   RBC Latest Ref Range: 4.20 - 5.40 M/uL 4.51   Hemoglobin Latest Ref Range: 12.0 - 16.0 g/dL 13.7   Hematocrit Latest Ref Range: 37.0 - 47.0 % 42.7   MCV Latest Ref Range: 81.4 - 97.8 fL 94.7   MCH Latest Ref Range: 27.0 - 33.0 pg 30.4   MCHC Latest Ref Range: 33.6 - 35.0 g/dL 32.1 (L)   RDW Latest Ref Range: 35.9 - 50.0 fL 42.7   Platelet Count Latest Ref Range: 164 - 446 K/uL 202   MPV Latest Ref Range: 9.0 - 12.9 fL 10.6   Neutrophils-Polys Latest Ref Range: 44.00 - 72.00 % 50.00   Neutrophils (Absolute) Latest Ref Range: 2.00 - 7.15 K/uL 1.90 (L)   Lymphocytes Latest Ref Range: 22.00 - 41.00 % 36.60   Lymphs (Absolute) Latest Ref Range: 1.00 - 4.80 K/uL 1.39   Monocytes Latest Ref Range: 0.00 - 13.40 % 8.90   Monos (Absolute) Latest Ref Range: 0.00 - 0.85 K/uL 0.34   Eosinophils Latest Ref Range: 0.00 - 6.90 % 3.20   Eos (Absolute) Latest Ref Range: 0.00 - 0.51 K/uL 0.12   Basophils Latest Ref Range: 0.00 - 1.80 % 1.30   Baso (Absolute) Latest Ref Range: 0.00 - 0.12 K/uL 0.05   Immature Granulocytes Latest Ref Range: 0.00 - 0.90 % 0.00   Immature Granulocytes (abs) Latest Ref Range: 0.00 - 0.11 K/uL 0.00   Nucleated RBC Latest Units: /100 WBC 0.00   NRBC (Absolute) Latest Units: K/uL 0.00        Ref. Range 10/15/2020 11:02   Procalcitonin Latest Ref Range: <0.25 ng/mL <0.05       Patient presents with intermittent hemoptysis    10/15/2020 CXR 2 views  1.  Pulmonary opacifications are identified which are most prominent in the lingular segment left upper pulmonary lobe. These could be due to fibrosis. However, pneumonitis or pneumonia is also possible.     2.  No focal consolidations are noted.    10/20/2020 Chest CT with  1.  Multifocal pulmonary architecture distortion with associated consolidation or mass located within left upper  lobe, left lower lobe, extensively in the right upper lobe and also in the right middle lobe     2.  Cystic change within the inferior lingular segment in the left upper lobe and the left lower lobe     3.  These findings suggest chronic infection and/or inflammation and atypical infection including TB should be considered in the differential diagnosis     4.  Neoplasm unlikely     5.  Mild left hilar and aorticopulmonary window adenopathy which is most likely reactive     6.  Short-term CT follow-up is likely indicated     Pending pulmonology appointment    Referral to infectious disease to help evaluate for chronic lung infection/atypical infection

## 2020-10-20 NOTE — PROGRESS NOTES
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 Range 10/15/2020 11:02   WBC Latest Ref Range: 4.8 - 10.8 K/uL 3.8 (L)   RBC Latest Ref Range: 4.20 - 5.40 M/uL 4.51   Hemoglobin Latest Ref Range: 12.0 - 16.0 g/dL 13.7   Hematocrit Latest Ref Range: 37.0 - 47.0 % 42.7   MCV Latest Ref Range: 81.4 - 97.8 fL 94.7   MCH Latest Ref Range: 27.0 - 33.0 pg 30.4   MCHC Latest Ref Range: 33.6 - 35.0 g/dL 32.1 (L)   RDW Latest Ref Range: 35.9 - 50.0 fL 42.7   Platelet Count Latest Ref Range: 164 - 446 K/uL 202   MPV Latest Ref Range: 9.0 - 12.9 fL 10.6   Neutrophils-Polys Latest Ref Range: 44.00 - 72.00 % 50.00   Neutrophils (Absolute) Latest Ref Range: 2.00 - 7.15 K/uL 1.90 (L)   Lymphocytes Latest Ref Range: 22.00 - 41.00 % 36.60   Lymphs (Absolute) Latest Ref Range: 1.00 - 4.80 K/uL 1.39   Monocytes Latest Ref Range: 0.00 - 13.40 % 8.90   Monos (Absolute) Latest Ref Range: 0.00 - 0.85 K/uL 0.34   Eosinophils Latest Ref Range: 0.00 - 6.90 % 3.20   Eos (Absolute) Latest Ref Range: 0.00 - 0.51 K/uL 0.12   Basophils Latest Ref Range: 0.00 - 1.80 % 1.30   Baso (Absolute) Latest Ref Range: 0.00 - 0.12 K/uL 0.05   Immature Granulocytes Latest Ref Range: 0.00 - 0.90 % 0.00   Immature Granulocytes (abs) Latest Ref Range: 0.00 - 0.11 K/uL 0.00   Nucleated RBC Latest Units: /100 WBC 0.00   NRBC (Absolute) Latest Units: K/uL 0.00           Ref. Range 10/15/2020 11:02   Procalcitonin Latest Ref Range: <0.25 ng/mL <0.05         Patient presents with intermittent  hemoptysis     10/15/2020 CXR 2 views  1.  Pulmonary opacifications are identified which are most prominent in the lingular segment left upper pulmonary lobe. These could be due to fibrosis. However, pneumonitis or pneumonia is also possible.     2.  No focal consolidations are noted.     10/20/2020 Chest CT with    1.  Multifocal pulmonary architecture distortion with associated consolidation or mass located within left upper lobe, left lower lobe, extensively in the right upper lobe and also in the right middle lobe     2.  Cystic change within the inferior lingular segment in the left upper lobe and the left lower lobe     3.  These findings suggest chronic infection and/or inflammation and atypical infection including TB should be considered in the differential diagnosis     4.  Neoplasm unlikely     5.  Mild left hilar and aorticopulmonary window adenopathy which is most likely reactive     6.  Short-term CT follow-up is likely indicated     Pending pulmonology appointment     Referral to infectious disease to help evaluate for chronic lung infection/atypical infection

## 2020-10-21 ENCOUNTER — TELEPHONE (OUTPATIENT)
Dept: MEDICAL GROUP | Facility: MEDICAL CENTER | Age: 50
End: 2020-10-21

## 2020-10-21 DIAGNOSIS — R04.2 HEMOPTYSIS: ICD-10-CM

## 2020-10-21 DIAGNOSIS — R93.89 ABNORMAL CHEST CT: ICD-10-CM

## 2020-10-21 NOTE — PROGRESS NOTES
three sputum specimens (obtained via cough or induction at least eight hours apart and including at least one early-morning specimen) should be submitted for AFB smear, mycobacterial culture, and YULIET testing     Hemoptysis  -     M. TUBERCULOSIS DNA PROBE; Future  -     CULTURE RESPIRATORY W/ GRM STN; Future  -     AFB CULTURE AND SMEAR,BROTH  -     AFB CULTURE AND SMEAR,BROTH  -     AFB CULTURE AND SMEAR,BROTH    Abnormal chest CT  -     M. TUBERCULOSIS DNA PROBE; Future  -     CULTURE RESPIRATORY W/ GRM STN; Future  -     AFB CULTURE AND SMEAR,BROTH  -     AFB CULTURE AND SMEAR,BROTH  -     AFB CULTURE AND SMEAR,BROTH

## 2020-10-24 ENCOUNTER — HOSPITAL ENCOUNTER (OUTPATIENT)
Dept: LAB | Facility: MEDICAL CENTER | Age: 50
End: 2020-10-24
Payer: COMMERCIAL

## 2020-10-24 LAB — COVID ORDER STATUS COVID19: NORMAL

## 2020-10-24 PROCEDURE — U0003 INFECTIOUS AGENT DETECTION BY NUCLEIC ACID (DNA OR RNA); SEVERE ACUTE RESPIRATORY SYNDROME CORONAVIRUS 2 (SARS-COV-2) (CORONAVIRUS DISEASE [COVID-19]), AMPLIFIED PROBE TECHNIQUE, MAKING USE OF HIGH THROUGHPUT TECHNOLOGIES AS DESCRIBED BY CMS-2020-01-R: HCPCS

## 2020-10-25 LAB
SARS-COV-2 RNA RESP QL NAA+PROBE: NOTDETECTED
SPECIMEN SOURCE: NORMAL

## 2020-10-29 ENCOUNTER — TELEPHONE (OUTPATIENT)
Dept: MEDICAL GROUP | Facility: MEDICAL CENTER | Age: 50
End: 2020-10-29

## 2020-10-29 DIAGNOSIS — R04.2 HEMOPTYSIS: ICD-10-CM

## 2020-10-30 NOTE — ASSESSMENT & PLAN NOTE
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  Abnormal chest x-ray     TECHNIQUE/EXAM DESCRIPTION:  CT scan of the chest with contrast.     Thin-section helical images were obtained from the lung apices through the adrenal glands following the bolus administration of 75 mL of Omnipaque 350 nonionic contrast.     Low dose optimization technique was utilized for this CT exam including automated exposure control and adjustment of the mA and/or kV according to patient size.     COMPARISON:  2 view chest 10/15/2020     FINDINGS:  Osseous structures: No significant bony abnormality is present.     LUNGS: Within the left upper lobe there is consolidation or mass measuring 18 x 10 mm. There are also multiple areas of architectural distortion consistent with scarring     Within the inferior lingular segment there is extensive bullous change with partial collapse indicating scarring and there is probably associated bronchiectasis.     Similarly, there is cystic change with volume loss and architectural distortion posterior medially in the left lower lobe.     In the right upper lobe there is nodular consolidation or mass measuring in total approximately 5.6 cm.     In the right middle lobe there is consolidation or mass measuring 16 x 10 mm. Laterally there is similar consolidation or mass measuring 10 x 6 mm. associated nodule measuring 12 x 10 mm and 7 mm.     MEDIASTINUM: There is mild left hilar adenopathy which is most likely reactive. Also, there is mild adenopathy within the aorticopulmonary window.     AORTA:  The aorta is normal in appearance.     PLEURA: There no pleural effusion or pneumothoraces.     ABDOMEN: The visualized portions of the upper abdomen are within normal limits.     IMPRESSION:     1.  Multifocal pulmonary architecture distortion with associated consolidation or mass located within left upper lobe, left lower lobe, extensively in the right upper lobe and also in the right middle lobe     2.  Cystic change within the inferior lingular segment in the left upper lobe and the left lower lobe     3.  These findings suggest chronic infection and/or inflammation and atypical infection including TB should be considered in the differential diagnosis     4.  Neoplasm unlikely     5.  Mild left hilar and aorticopulmonary window adenopathy which is most likely reactive     6.  Short-term CT follow-up is likely indicated    AFB sputum: positive for MICHELLE x3, negative for MTB, M. gordonse, M. kansasii,   NAAT negative for MTB x2  Pending liquid culture, ID to follow   Quantiferon negative    Pending [facility] Infectious disease appointment with Dr. Barrios for possible MAC treatment  Pending pulmonology appointment.

## 2020-11-18 ENCOUNTER — OFFICE VISIT (OUTPATIENT)
Dept: SLEEP MEDICINE | Facility: MEDICAL CENTER | Age: 50
End: 2020-11-18
Payer: COMMERCIAL

## 2020-11-18 ENCOUNTER — HOSPITAL ENCOUNTER (OUTPATIENT)
Dept: LAB | Facility: MEDICAL CENTER | Age: 50
End: 2020-11-18
Attending: INTERNAL MEDICINE
Payer: COMMERCIAL

## 2020-11-18 ENCOUNTER — TELEPHONE (OUTPATIENT)
Dept: SLEEP MEDICINE | Facility: MEDICAL CENTER | Age: 50
End: 2020-11-18

## 2020-11-18 VITALS
WEIGHT: 103 LBS | TEMPERATURE: 98.1 F | RESPIRATION RATE: 16 BRPM | BODY MASS INDEX: 17.16 KG/M2 | DIASTOLIC BLOOD PRESSURE: 64 MMHG | HEIGHT: 65 IN | HEART RATE: 57 BPM | OXYGEN SATURATION: 97 % | SYSTOLIC BLOOD PRESSURE: 126 MMHG

## 2020-11-18 DIAGNOSIS — R04.2 HEMOPTYSIS: ICD-10-CM

## 2020-11-18 DIAGNOSIS — R93.89 ABNORMAL CT OF THE CHEST: ICD-10-CM

## 2020-11-18 DIAGNOSIS — R63.6 UNDERWEIGHT: ICD-10-CM

## 2020-11-18 PROCEDURE — 82785 ASSAY OF IGE: CPT

## 2020-11-18 PROCEDURE — 83516 IMMUNOASSAY NONANTIBODY: CPT

## 2020-11-18 PROCEDURE — 82784 ASSAY IGA/IGD/IGG/IGM EACH: CPT

## 2020-11-18 PROCEDURE — 36415 COLL VENOUS BLD VENIPUNCTURE: CPT

## 2020-11-18 PROCEDURE — 99204 OFFICE O/P NEW MOD 45 MIN: CPT | Performed by: INTERNAL MEDICINE

## 2020-11-18 PROCEDURE — 86038 ANTINUCLEAR ANTIBODIES: CPT

## 2020-11-18 ASSESSMENT — ENCOUNTER SYMPTOMS
ORTHOPNEA: 0
CONSTIPATION: 0
SPUTUM PRODUCTION: 0
TREMORS: 0
HEMOPTYSIS: 0
MYALGIAS: 0
FALLS: 0
EYE PAIN: 0
PHOTOPHOBIA: 0
COUGH: 0
PND: 0
NAUSEA: 0
SORE THROAT: 0
DEPRESSION: 0
BLURRED VISION: 0
STRIDOR: 0
DIAPHORESIS: 0
SINUS PAIN: 0
EYE DISCHARGE: 0
ABDOMINAL PAIN: 0
HEADACHES: 0
BACK PAIN: 0
CLAUDICATION: 0
FOCAL WEAKNESS: 0
DOUBLE VISION: 0
EYE REDNESS: 0
PALPITATIONS: 0
FEVER: 0
DIARRHEA: 0
WEAKNESS: 0
VOMITING: 0
WHEEZING: 0
NECK PAIN: 0
CHILLS: 0
WEIGHT LOSS: 0
SPEECH CHANGE: 0
SHORTNESS OF BREATH: 0
HEARTBURN: 0
DIZZINESS: 0

## 2020-11-18 ASSESSMENT — FIBROSIS 4 INDEX: FIB4 SCORE: 1.4

## 2020-11-18 NOTE — PROGRESS NOTES
Chief Complaint   Patient presents with   • Establish Care     referral 10/15/2020 CASEY Farias MD    • Results     Ct 10/20/2020, CXR 10/15/2020       HPI: This patient is a 50 y.o. female presenting for evaluation of hemoptysis.  The patient has essentially no past medical history.  She is a lifelong non-smoker.  She takes only over-the-counter vitamins but no regular medication.  She is originally from [place] but emigrated to the  14 years ago.  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  She presents today for evaluation of recurrent hemoptysis.  First episode occurred 1 time in 2016.  Patient [place] any associated symptoms at the time including chest pain, fevers, shortness of breath.  She had no recurrence until July of this year and then had 2 additional occurrences in September.  Patient reports onset of coughing spell after which she coughs up verena, bright red blood estimated at 1 to 3 teaspoons at a time.  Again, no associated symptoms including shortness of breath, chest pain.  No fevers or chills.  No night sweats or weight loss.  No cough outside of her acute episodes.  She denies chronic sinus issues.  She is currently being evaluated for some left elbow pain and left shoulder pain which she attributes to repetitive injury related to work.  She does report some occasional bilateral pain and stiffness in the small joints of her hands.  She previously was sensitive to cold but this has improved and she denies Raynaud syndrome.  Family history is notable for mother who suffered from what sounds like restrictive lung disease but lived to the age of 85.  Per daughter who is doing the translating today, her grandmother had continual shrinkage of her lungs.  Both patient and daughter tell me that she has always been thin with no weight loss and patient reports good appetite.  A CT chest done on October 20 to evaluate her symptoms showed nodular densities in bilateral upper lobes,  bronchiectasis in the right middle lobe and to a much greater extent in the lingula and posterior left lower lobe.  No significant lymphadenopathy.  She was seen in TB clinic and ruled out for tuberculosis with AFB sputum x3 and QuantiFERON which was negative.  She has plans to follow-up with infectious disease and was referred to us for further evaluation.  She reports no current symptoms.  No recurrent hemoptysis since September.  No current cough, shortness of breath, chest pain, fevers, chills, night sweats, weight loss.  Laboratory studies that were obtained were unremarkable for the most part of than mild leukopenia with white blood cell count on 10/15 of 3.8.  Normal renal function and no hematuria.  Covid test was negative.  Procalcitonin undetectable.    History reviewed. No pertinent past medical history.    Social History     Socioeconomic History   • Marital status:      Spouse name: Not on file   • Number of children: Not on file   • Years of education: Not on file   • Highest education level: Not on file   Occupational History     Comment:    Social Needs   • Financial resource strain: Not on file   • Food insecurity     Worry: Not on file     Inability: Not on file   • Transportation needs     Medical: Not on file     Non-medical: Not on file   Tobacco Use   • Smoking status: Never Smoker   • Smokeless tobacco: Never Used   Substance and Sexual Activity   • Alcohol use: No   • Drug use: No   • Sexual activity: Not on file   Lifestyle   • Physical activity     Days per week: Not on file     Minutes per session: Not on file   • Stress: Not on file   Relationships   • Social connections     Talks on phone: Not on file     Gets together: Not on file     Attends Protestant service: Not on file     Active member of club or organization: Not on file     Attends meetings of clubs or organizations: Not on file     Relationship status: Not on file   • Intimate partner violence     Fear of current or  "ex partner: Not on file     Emotionally abused: Not on file     Physically abused: Not on file     Forced sexual activity: Not on file   Other Topics Concern   • Not on file   Social History Narrative   • Not on file       Family History   Problem Relation Age of Onset   • Lung Disease Mother    • Cancer Paternal Uncle 72        Throat cancer    • Cancer Maternal Grandmother 65        Breast cancer       No current outpatient medications on file prior to visit.     No current facility-administered medications on file prior to visit.        Allergies: Patient has no known allergies.    ROS:   Review of Systems   Constitutional: Negative for chills, diaphoresis, fever, malaise/fatigue and weight loss.   HENT: Negative for congestion, ear discharge, ear pain, hearing loss, nosebleeds, sinus pain, sore throat and tinnitus.    Eyes: Negative for blurred vision, double vision, photophobia, pain, discharge and redness.   Respiratory: Negative for cough, hemoptysis, sputum production, shortness of breath, wheezing and stridor.    Cardiovascular: Negative for chest pain, palpitations, orthopnea, claudication, leg swelling and PND.   Gastrointestinal: Negative for abdominal pain, constipation, diarrhea, heartburn, nausea and vomiting.   Genitourinary: Negative for dysuria and urgency.   Musculoskeletal: Positive for joint pain. Negative for back pain, falls, myalgias and neck pain.   Skin: Negative for itching and rash.   Neurological: Negative for dizziness, tremors, speech change, focal weakness, weakness and headaches.   Endo/Heme/Allergies: Negative for environmental allergies.   Psychiatric/Behavioral: Negative for depression.       /64 (BP Location: Right arm, Patient Position: Sitting, BP Cuff Size: Adult)   Pulse (!) 57   Temp 36.7 °C (98.1 °F) (Temporal)   Resp 16   Ht 1.651 m (5' 5\")   Wt 46.7 kg (103 lb)   SpO2 97%     Physical Exam:  Physical Exam  Constitutional:       General: She is not in acute " distress.     Appearance: Normal appearance. She is well-developed.      Comments: Underweight   HENT:      Head: Normocephalic and atraumatic.      Right Ear: External ear normal.      Left Ear: External ear normal.      Nose: Nose normal. No congestion.      Mouth/Throat:      Mouth: Mucous membranes are moist.      Pharynx: Oropharynx is clear. No oropharyngeal exudate.   Eyes:      General: No scleral icterus.     Extraocular Movements: Extraocular movements intact.      Conjunctiva/sclera: Conjunctivae normal.      Pupils: Pupils are equal, round, and reactive to light.   Neck:      Musculoskeletal: Normal range of motion and neck supple.      Vascular: No JVD.      Trachea: No tracheal deviation.   Cardiovascular:      Rate and Rhythm: Normal rate and regular rhythm.      Heart sounds: Normal heart sounds. No murmur. No friction rub. No gallop.    Pulmonary:      Effort: Pulmonary effort is normal. No accessory muscle usage or respiratory distress.      Breath sounds: Normal breath sounds. No wheezing or rales.   Abdominal:      General: There is no distension.      Palpations: Abdomen is soft.      Tenderness: There is no abdominal tenderness.   Musculoskeletal: Normal range of motion.         General: No tenderness or deformity.      Right lower leg: No edema.      Left lower leg: No edema.   Lymphadenopathy:      Cervical: No cervical adenopathy.   Skin:     General: Skin is warm and dry.      Findings: No rash.      Nails: There is no clubbing.     Neurological:      Mental Status: She is alert and oriented to person, place, and time.      Cranial Nerves: No cranial nerve deficit.      Gait: Gait normal.   Psychiatric:         Mood and Affect: Mood normal.         PFTs as reviewed by me personally: None    Imaging as reviewed by me personally: As per HPI    Assessment:  1. Hemoptysis  GOYO IGG GUNNER W/RFLX TO GOYO IGG IFA    MPO/IN-3 (ANCA) ABS    IMMUNOGLOBULINS A/E/G/M, SERUM    Bronchoscopy   2. Abnormal  CT of the chest  GOYO IGG GUNNER W/RFLX TO GOYO IGG IFA    MPO/IA-3 (ANCA) ABS    IMMUNOGLOBULINS A/E/G/M, SERUM    Bronchoscopy   3. Underweight         Plan:  1.  This is both chronic and acute and given recurrent episodes I suspect the process causing CT findings is likely chronic.  Specifically, bronchiectasis can cause hemoptysis.  She has been ruled out for tuberculosis however given nodular findings in addition to bronchiectasis with some cystic changes, there is a possibility for autoimmune or more specifically granulomatous disease or vasculitis.  Certainly there are other chronic infection such as coccidiomycosis which should be considered.  We will start work-up with screening GOYO as well as ANCA antibodies and serum immunoglobulins.  We will plan on bronchoscopy with transbronchial biopsies and BAL to evaluate for other chronic infections and in the meantime patient has follow-up with infectious disease.  Patient is low risk for malignancy and CT findings are more consistent with chronic process not consistent with malignancy.  2.  See discussion above.  Given recurrent hemoptysis, I do think she warrants bronchoscopy with transbronchial biopsies and BAL.  3.  Per patient this is her normal weight with no chronic or acute weight loss.  Encouraged healthy lifestyle habits.  Return in about 3 months (around 2/18/2021) for labs, bronch.

## 2020-11-18 NOTE — TELEPHONE ENCOUNTER
Bronchoscopy scheduled for 12/3/2020 checking in at 1330 starting at 1530.  Scheduled with patient in office with daughter.   Patient will check in in about a week to see authorization for it.

## 2020-11-20 LAB
IGA SERPL-MCNC: 208 MG/DL (ref 68–408)
IGG SERPL-MCNC: 1610 MG/DL (ref 768–1632)
IGM SERPL-MCNC: 38 MG/DL (ref 35–263)
MYELOPEROXIDASE AB SER-ACNC: 0 AU/ML (ref 0–19)
NUCLEAR IGG SER QL IA: NORMAL
PROTEINASE3 AB SER-ACNC: 11 AU/ML (ref 0–19)

## 2020-11-21 LAB — IGE SERPL-ACNC: 7 KU/L

## 2020-11-30 NOTE — PROGRESS NOTES
New patient note    Physiatry (physical medicine and  Rehabilitation), interventional spine and sports medicine    Date of service: See epic    Chief complaint:   Chief Complaint   Patient presents with   • New Patient     Elbow pain        Referring provider: Dontrell Farias    HISTORY    HPI: Martinez Lopez 50 y.o.  who presents today with Diagnoses of Left elbow pain and Left lateral epicondylitis were pertinent to this visit.     services were used in the patient's primary language of Mandarin.     Name or Number: [id number] Kerrie Ramirez  Mode of interpretation: iPad      HPI    Worse with cooking, lifting with functional deficits. 2 months of pain. Worse in the left lateral elbow. She has tried rest, exercises, counterforce brace, massage with no improvement. She has tried topical medications with no improvement. Aching in quality, 5/10 intensity. Nonradiating. Constant.         Medical records review:  I reviewed the note from the referring provider Dontrell Farias* including the note dated 10/15/2020 where the patient was seen for multiple issues including hemoptysis sent to the pulmonary and sleep medicine with labs ordered.  The patient was also sent to gastroenterology.  For her left elbow pain she was sent to physiatry.  Conservative treatments were done..           ROS:   Red Flags ROS:   Fever, Chills, Sweats: Denies  Involuntary Weight Loss: Denies  Bladder Incontinence: Denies  Bowel Incontinence: denies  Saddle Anesthesia: Denies    All other systems reviewed and negative.       PMHx:   History reviewed. No pertinent past medical history.      No current outpatient medications on file prior to visit.     No current facility-administered medications on file prior to visit.         PSHx:   Past Surgical History:   Procedure Laterality Date   • TUBAL COAGULATION LAPAROSCOPIC BILATERAL  1994       Family history   Family History   Problem Relation Age of Onset   • Lung  Disease Mother    • Cancer Paternal Uncle 72        Throat cancer    • Cancer Maternal Grandmother 65        Breast cancer         Medications: reviewed on epic.   Outpatient Medications Marked as Taking for the 12/2/20 encounter (Office Visit) with Jordan Pritchard M.D.   Medication Sig Dispense Refill   • diclofenac sodium 1 % Gel Place 3 g on the skin 4 times a day as needed (pain). 100 g 3        Allergies:   No Known Allergies    Social Hx:   Social History     Socioeconomic History   • Marital status:      Spouse name: Not on file   • Number of children: Not on file   • Years of education: Not on file   • Highest education level: Not on file   Occupational History     Comment:    Social Needs   • Financial resource strain: Not on file   • Food insecurity     Worry: Not on file     Inability: Not on file   • Transportation needs     Medical: Not on file     Non-medical: Not on file   Tobacco Use   • Smoking status: Never Smoker   • Smokeless tobacco: Never Used   Substance and Sexual Activity   • Alcohol use: No   • Drug use: No   • Sexual activity: Not on file   Lifestyle   • Physical activity     Days per week: Not on file     Minutes per session: Not on file   • Stress: Not on file   Relationships   • Social connections     Talks on phone: Not on file     Gets together: Not on file     Attends Shinto service: Not on file     Active member of club or organization: Not on file     Attends meetings of clubs or organizations: Not on file     Relationship status: Not on file   • Intimate partner violence     Fear of current or ex partner: Not on file     Emotionally abused: Not on file     Physically abused: Not on file     Forced sexual activity: Not on file   Other Topics Concern   •  Service No   • Blood Transfusions No   • Caffeine Concern No   • Occupational Exposure No   • Hobby Hazards No   • Sleep Concern No   • Stress Concern No   • Weight Concern No   • Special Diet Not Asked   •  "Back Care No   • Exercise No   • Bike Helmet No   • Seat Belt Yes   • Self-Exams No   Social History Narrative   • Not on file         EXAMINATION     Physical Exam:   Vitals: /62 (BP Location: Left arm, Patient Position: Sitting, BP Cuff Size: Adult)   Pulse 63   Temp 36.4 °C (97.6 °F) (Temporal)   Ht 1.651 m (5' 5\")   Wt 47.3 kg (104 lb 4.4 oz)   SpO2 94%     Constitutional:   Body Habitus: Body mass index is 17.35 kg/m².  Cooperation: Fully cooperates with exam  Appearance: Well-groomed, well-nourished, not disheveled     Eyes: No scleral icterus to suggest severe liver disease, no proptosis to suggest severe hyperthyroid    ENT -no obvious auditory deficits, no obvious tongue lesions, tongue midline, no facial droop     Skin -no rashes or lesions noted     Respiratory-  breathing comfortable on room air, no audible wheezing    Cardiovascular- capillary refills less than 2 seconds. No lower extremity edema is noted.     Gastrointestinal - no obvious abdominal masses, No tenderness to palpation in the abdomen    Psychiatric- alert and oriented ×3. Normal affect.     Gait - normal gait, no use of ambulatory device, nonantalgic.     Musculoskeletal and Neuro -     left elbow exam  Inspection: No rashes, swelling or deformities. No swelling around the olecranon.  Palpation: Positive tenderness to palpation over the common extensor tendon and lateral epicondyle. No tenderness palpation to the medial epicondyle, olecranon, cubital fossa, common flexor tendon.  Range of motion: Normal in the elbow in flexion and extension.  Special tests:  Resisted wrist extension (Cozens test) causes pain at the lateral epicondyle  Resisted wrist flexion: Negative     right elbow exam  Inspection: No rashes, swelling or deformities. No swelling around the olecranon.  Palpation:  No tenderness palpation to the lateral epicondyle, medial epicondyle, olecranon, cubital fossa, common flexor tendon.  Range of motion: Normal in " the elbow in flexion and extension.  Special tests:  Resisted wrist extension (Cozens test): negative  Resisted wrist flexion: Negative     right and left Shoulder   Inspection: No rashes are noted on the bilateral shoulders. Humerus is not grossly high riding when comparing the right and left sides.  Palpation: No tenderness to palpation over the biceps tendon, acromioclavicular joint, scapular spine, supraspinous, infraspinatus, greater tuberosity, lesser tuberosity, trapezius.  Range of motion: Active range of motion forward ark and lateral arc within normal limits. The patient is able to lower his arm slowly with no drop arm.  Special tests:  Neers: negative  Baugh: negative  Speeds: Negative  Jürgensen signs: Negative  Empty can: negative  O'Briens test: Negative  Crossarm test: Negative  Resisted internal rotation: Negative  Resisted external rotation: neagtive  Resisted elbow extension: Negative       Cervical spine   Inspection: No deformities of the skin over the cervical spine. No rashes or lesions.    full   active range of motion in all directions, without  pain      Spurling’s sign: negative bilaterally    No signs of muscular atrophy in bilateral upper extremities     No tenderness to palpation of the cervical facets.  No other tenderness palpation throughout the cervical spine    Key points for the international standards for neurological classification of spinal cord injury (ISNCSCI) to light touch.     Dermatome R L   C4 2 2   C5 2 2   C6 2 2   C7 2 2   C8 2 2   T1 2 2   T2 2 2                                     Motor Exam Upper Extremities   ? Myotome R L   Shoulder flexion C5 5 5   Elbow flexion C5 5 5   Wrist extension C6 5 5   Elbow extension C7 5 5   Finger flexion C8 5 5   Finger abduction T1 5 5     Reflexes  ?  R L   Biceps  2+ 2+   Brachioradialis  2+ 2+     Ordonez’s sign negative bilaterally                MEDICAL DECISION MAKING    Medical records review: see under HPI section.      DATA    Labs:   Lab Results   Component Value Date/Time    SODIUM 142 10/15/2020 11:02 AM    POTASSIUM 4.1 10/15/2020 11:02 AM    CHLORIDE 105 10/15/2020 11:02 AM    CO2 32 10/15/2020 11:02 AM    ANION 5.0 (L) 10/15/2020 11:02 AM    GLUCOSE 82 10/15/2020 11:02 AM    BUN 15 10/15/2020 11:02 AM    CREATININE 0.41 (L) 10/15/2020 11:02 AM    CALCIUM 9.3 10/15/2020 11:02 AM    ASTSGOT 23 10/15/2020 11:02 AM    ALTSGPT 18 10/15/2020 11:02 AM    TBILIRUBIN 0.8 10/15/2020 11:02 AM    ALBUMIN 4.5 10/15/2020 11:02 AM    TOTPROTEIN 7.6 10/15/2020 11:02 AM    GLOBULIN 3.1 10/15/2020 11:02 AM    AGRATIO 1.5 10/15/2020 11:02 AM   ]    Lab Results   Component Value Date/Time    PROTHROMBTM 14.3 10/15/2020 11:02 AM    INR 1.08 10/15/2020 11:02 AM        Lab Results   Component Value Date/Time    WBC 3.8 (L) 10/15/2020 11:02 AM    RBC 4.51 10/15/2020 11:02 AM    HEMOGLOBIN 13.7 10/15/2020 11:02 AM    HEMATOCRIT 42.7 10/15/2020 11:02 AM    MCV 94.7 10/15/2020 11:02 AM    MCH 30.4 10/15/2020 11:02 AM    MCHC 32.1 (L) 10/15/2020 11:02 AM    MPV 10.6 10/15/2020 11:02 AM    NEUTSPOLYS 50.00 10/15/2020 11:02 AM    LYMPHOCYTES 36.60 10/15/2020 11:02 AM    MONOCYTES 8.90 10/15/2020 11:02 AM    EOSINOPHILS 3.20 10/15/2020 11:02 AM    BASOPHILS 1.30 10/15/2020 11:02 AM        No results found for: HBA1C     Imaging:   I personally reviewed following images, these are my reads    IMAGING radiology reads. I reviewed the following radiology reads                 Results for orders placed during the hospital encounter of 10/15/20   DX-CHEST-2 VIEWS    Impression 1.  Pulmonary opacifications are identified which are most prominent in the lingular segment left upper pulmonary lobe. These could be due to fibrosis. However, pneumonitis or pneumonia is also possible.    2.  No focal consolidations are noted.                                                                                   Diagnosis   Visit Diagnoses     ICD-10-CM   1. Left elbow  pain  M25.522   2. Left lateral epicondylitis  M77.12           ASSESSMENT AND PLAN:  Martinez Lopez 50 y.o. female      Martinez was seen today for new patient.    Diagnoses and all orders for this visit:    Left elbow pain  -     diclofenac sodium 1 % Gel; Place 3 g on the skin 4 times a day as needed (pain).  -     REFERRAL TO PHYSICAL THERAPY    Left lateral epicondylitis  -     diclofenac sodium 1 % Gel; Place 3 g on the skin 4 times a day as needed (pain).  -     REFERRAL TO PHYSICAL THERAPY      I showed the patient how to optimally use her counterforce brace.    Physical therapy: I ordered physical therapy to focus on strengthening and stretching.     home exercise program: I provided the patient with a strengthening and stretching with a home exercise program     Diagnostic workup:   I would consider the patient for a diagnostic ultrasound at the next visit if there is no improvement with conservative treatment    Medications: Topical diclofenac as above    Interventional program:   We discussed a common extensor peritendinous injection, Tenex, imaging and the patient declined      Follow-up: After the above diagnostic studies           Please note that this dictation was created using voice recognition software. I have made every reasonable attempt to correct obvious errors but there may be errors of grammar and content that I may have overlooked prior to finalization of this note.      Jordan Pritchard MD  Physical Medicine and Rehabilitation  Interventional Spine and Sports Physiatry  [facility]           CC Dontrell Farias*

## 2020-12-02 ENCOUNTER — OFFICE VISIT (OUTPATIENT)
Dept: PHYSICAL MEDICINE AND REHAB | Facility: MEDICAL CENTER | Age: 50
End: 2020-12-02
Payer: COMMERCIAL

## 2020-12-02 VITALS
WEIGHT: 104.28 LBS | BODY MASS INDEX: 17.37 KG/M2 | HEART RATE: 63 BPM | SYSTOLIC BLOOD PRESSURE: 102 MMHG | DIASTOLIC BLOOD PRESSURE: 62 MMHG | OXYGEN SATURATION: 94 % | TEMPERATURE: 97.6 F | HEIGHT: 65 IN

## 2020-12-02 DIAGNOSIS — M25.522 LEFT ELBOW PAIN: ICD-10-CM

## 2020-12-02 DIAGNOSIS — M77.12 LEFT LATERAL EPICONDYLITIS: ICD-10-CM

## 2020-12-02 PROCEDURE — 99204 OFFICE O/P NEW MOD 45 MIN: CPT | Performed by: PHYSICAL MEDICINE & REHABILITATION

## 2020-12-02 ASSESSMENT — PATIENT HEALTH QUESTIONNAIRE - PHQ9: CLINICAL INTERPRETATION OF PHQ2 SCORE: 0

## 2020-12-02 ASSESSMENT — FIBROSIS 4 INDEX: FIB4 SCORE: 1.4

## 2020-12-02 ASSESSMENT — PAIN SCALES - GENERAL: PAINLEVEL: 5=MODERATE PAIN

## 2020-12-02 NOTE — Clinical Note
Dear Dontrell Farias M.D. ,     Thank you for the referral of Martinez Lopez.      Please see my note for more details       Should you have any questions or concerns please do not hesitate to contact me.    Jordan Pritchard M.D.

## 2021-02-17 ENCOUNTER — APPOINTMENT (OUTPATIENT)
Dept: SLEEP MEDICINE | Facility: MEDICAL CENTER | Age: 51
End: 2021-02-17
Payer: COMMERCIAL

## 2021-03-24 ENCOUNTER — APPOINTMENT (OUTPATIENT)
Dept: SLEEP MEDICINE | Facility: MEDICAL CENTER | Age: 51
End: 2021-03-24
Payer: COMMERCIAL

## 2021-05-24 DIAGNOSIS — M25.522 LEFT ELBOW PAIN: ICD-10-CM

## 2021-05-24 DIAGNOSIS — M77.12 LEFT LATERAL EPICONDYLITIS: ICD-10-CM

## 2021-06-21 ENCOUNTER — TELEPHONE (OUTPATIENT)
Dept: SLEEP MEDICINE | Facility: MEDICAL CENTER | Age: 51
End: 2021-06-21

## 2021-06-21 DIAGNOSIS — R93.89 ABNORMAL CT OF THE CHEST: ICD-10-CM

## 2021-06-21 DIAGNOSIS — R04.2 HEMOPTYSIS: ICD-10-CM

## 2021-06-21 NOTE — TELEPHONE ENCOUNTER
Jackie Rubin M.D.  You 3 minutes ago (4:00 PM)     I think we can go ahead and schedule the bronchoscopy as it is unlikely I could get her in to see me in the next 4 months.    Message text

## 2021-06-21 NOTE — TELEPHONE ENCOUNTER
Patient's daughter called and left me a message regarding her mom and rescheduling the bronchoscopy they had canceled last year.   I wanted to check with you prior to scheduling that, or if you wanted to see her in office first.     Please Advise

## 2021-06-22 NOTE — TELEPHONE ENCOUNTER
Scheduled for 7/15/2021 with Dr. Larry  Checking in at 1100 for a 1300 start time.   Instructions went over with patient's daughter Brittney.   Dermal Autograft Text: The defect edges were debeveled with a #15 scalpel blade.  Given the location of the defect, shape of the defect and the proximity to free margins a dermal autograft was deemed most appropriate.  Using a sterile surgical marker, the primary defect shape was transferred to the donor site. The area thus outlined was incised deep to adipose tissue with a #15 scalpel blade.  The harvested graft was then trimmed of adipose and epidermal tissue until only dermis was left.  The skin graft was then placed in the primary defect and oriented appropriately.

## 2021-06-30 ENCOUNTER — PRE-ADMISSION TESTING (OUTPATIENT)
Dept: ADMISSIONS | Facility: MEDICAL CENTER | Age: 51
End: 2021-06-30
Attending: INTERNAL MEDICINE
Payer: COMMERCIAL

## 2021-06-30 ASSESSMENT — FIBROSIS 4 INDEX: FIB4 SCORE: 1.42

## 2021-06-30 NOTE — PREPROCEDURE INSTRUCTIONS
"Pre-admit appointment completed. \"Preparing for your Procedure\" instructions given to Pt with verbal, written, and video content. Pt states all instructions given are understood and to call pre-admit or Dr's office for additional questions or any symptoms of illness/covid develop prior to DOS. Medications the patient will take the morning of surgery per anesthesia protocol: None    Never had surgery before  Denies any anesthesia problems in the family  DaughterEmmy translated Mandarin.        "

## 2021-07-15 ENCOUNTER — ANESTHESIA (OUTPATIENT)
Dept: SURGERY | Facility: MEDICAL CENTER | Age: 51
End: 2021-07-15
Payer: COMMERCIAL

## 2021-07-15 ENCOUNTER — ANESTHESIA EVENT (OUTPATIENT)
Dept: SURGERY | Facility: MEDICAL CENTER | Age: 51
End: 2021-07-15
Payer: COMMERCIAL

## 2021-07-15 ENCOUNTER — APPOINTMENT (OUTPATIENT)
Dept: RADIOLOGY | Facility: MEDICAL CENTER | Age: 51
End: 2021-07-15
Attending: INTERNAL MEDICINE
Payer: COMMERCIAL

## 2021-07-15 ENCOUNTER — HOSPITAL ENCOUNTER (OUTPATIENT)
Facility: MEDICAL CENTER | Age: 51
End: 2021-07-15
Attending: INTERNAL MEDICINE | Admitting: INTERNAL MEDICINE
Payer: COMMERCIAL

## 2021-07-15 VITALS
BODY MASS INDEX: 17.23 KG/M2 | HEIGHT: 65 IN | RESPIRATION RATE: 18 BRPM | HEART RATE: 60 BPM | TEMPERATURE: 97.2 F | DIASTOLIC BLOOD PRESSURE: 51 MMHG | OXYGEN SATURATION: 92 % | WEIGHT: 103.4 LBS | SYSTOLIC BLOOD PRESSURE: 107 MMHG

## 2021-07-15 PROBLEM — R93.89 ABNORMAL CT OF THE CHEST: Status: ACTIVE | Noted: 2021-07-15

## 2021-07-15 LAB
APPEARANCE FLD: NORMAL
APPEARANCE FLD: NORMAL
BODY FLD TYPE: NORMAL
BODY FLD TYPE: NORMAL
COLOR FLD: NORMAL
COLOR FLD: NORMAL
CYTOLOGY REG CYTOL: NORMAL
CYTOLOGY REG CYTOL: NORMAL
EOSINOPHIL NFR FLD: 3 %
EOSINOPHIL NFR FLD: 4 %
LYMPHOCYTES NFR FLD: 1 %
LYMPHOCYTES NFR FLD: 15 %
MESOTHL CELL NFR FLD: 2 %
MESOTHL CELL NFR FLD: 25 %
MONONUC CELLS NFR FLD: 1 %
NEUTROPHILS NFR FLD: 56 %
NEUTROPHILS NFR FLD: 93 %
RBC # FLD: NORMAL CELLS/UL
RBC # FLD: NORMAL CELLS/UL
WBC # FLD: NORMAL CELLS/UL
WBC # FLD: NORMAL CELLS/UL

## 2021-07-15 PROCEDURE — 87281 PNEUMOCYSTIS CARINII AG IF: CPT

## 2021-07-15 PROCEDURE — 700101 HCHG RX REV CODE 250: Performed by: INTERNAL MEDICINE

## 2021-07-15 PROCEDURE — 88305 TISSUE EXAM BY PATHOLOGIST: CPT

## 2021-07-15 PROCEDURE — 160025 RECOVERY II MINUTES (STATS): Performed by: INTERNAL MEDICINE

## 2021-07-15 PROCEDURE — 160048 HCHG OR STATISTICAL LEVEL 1-5: Performed by: INTERNAL MEDICINE

## 2021-07-15 PROCEDURE — 86356 MONONUCLEAR CELL ANTIGEN: CPT | Mod: 91

## 2021-07-15 PROCEDURE — 160035 HCHG PACU - 1ST 60 MINS PHASE I: Performed by: INTERNAL MEDICINE

## 2021-07-15 PROCEDURE — 87015 SPECIMEN INFECT AGNT CONCNTJ: CPT | Mod: 91

## 2021-07-15 PROCEDURE — 700101 HCHG RX REV CODE 250: Performed by: ANESTHESIOLOGY

## 2021-07-15 PROCEDURE — 87206 SMEAR FLUORESCENT/ACID STAI: CPT | Mod: 91

## 2021-07-15 PROCEDURE — 700111 HCHG RX REV CODE 636 W/ 250 OVERRIDE (IP): Performed by: ANESTHESIOLOGY

## 2021-07-15 PROCEDURE — 160002 HCHG RECOVERY MINUTES (STAT): Performed by: INTERNAL MEDICINE

## 2021-07-15 PROCEDURE — 87116 MYCOBACTERIA CULTURE: CPT | Mod: 91

## 2021-07-15 PROCEDURE — 31624 DX BRONCHOSCOPE/LAVAGE: CPT | Performed by: INTERNAL MEDICINE

## 2021-07-15 PROCEDURE — 87070 CULTURE OTHR SPECIMN AEROBIC: CPT | Mod: 91

## 2021-07-15 PROCEDURE — 87205 SMEAR GRAM STAIN: CPT | Mod: 91

## 2021-07-15 PROCEDURE — 160046 HCHG PACU - 1ST 60 MINS PHASE II: Performed by: INTERNAL MEDICINE

## 2021-07-15 PROCEDURE — 87102 FUNGUS ISOLATION CULTURE: CPT

## 2021-07-15 PROCEDURE — 160029 HCHG SURGERY MINUTES - 1ST 30 MINS LEVEL 4: Performed by: INTERNAL MEDICINE

## 2021-07-15 PROCEDURE — 160009 HCHG ANES TIME/MIN: Performed by: INTERNAL MEDICINE

## 2021-07-15 PROCEDURE — 700105 HCHG RX REV CODE 258: Performed by: INTERNAL MEDICINE

## 2021-07-15 PROCEDURE — 87015 SPECIMEN INFECT AGNT CONCNTJ: CPT

## 2021-07-15 PROCEDURE — 88112 CYTOPATH CELL ENHANCE TECH: CPT | Mod: 91

## 2021-07-15 PROCEDURE — 87798 DETECT AGENT NOS DNA AMP: CPT

## 2021-07-15 PROCEDURE — 89051 BODY FLUID CELL COUNT: CPT | Mod: 91

## 2021-07-15 RX ORDER — IPRATROPIUM BROMIDE AND ALBUTEROL SULFATE 2.5; .5 MG/3ML; MG/3ML
3 SOLUTION RESPIRATORY (INHALATION)
Status: DISCONTINUED | OUTPATIENT
Start: 2021-07-15 | End: 2021-07-15 | Stop reason: HOSPADM

## 2021-07-15 RX ORDER — SODIUM CHLORIDE, SODIUM LACTATE, POTASSIUM CHLORIDE, CALCIUM CHLORIDE 600; 310; 30; 20 MG/100ML; MG/100ML; MG/100ML; MG/100ML
INJECTION, SOLUTION INTRAVENOUS CONTINUOUS
Status: DISCONTINUED | OUTPATIENT
Start: 2021-07-15 | End: 2021-07-15 | Stop reason: HOSPADM

## 2021-07-15 RX ORDER — LABETALOL HYDROCHLORIDE 5 MG/ML
5 INJECTION, SOLUTION INTRAVENOUS
Status: DISCONTINUED | OUTPATIENT
Start: 2021-07-15 | End: 2021-07-15 | Stop reason: HOSPADM

## 2021-07-15 RX ORDER — HYDRALAZINE HYDROCHLORIDE 20 MG/ML
5 INJECTION INTRAMUSCULAR; INTRAVENOUS
Status: DISCONTINUED | OUTPATIENT
Start: 2021-07-15 | End: 2021-07-15 | Stop reason: HOSPADM

## 2021-07-15 RX ORDER — HYDROMORPHONE HYDROCHLORIDE 1 MG/ML
0.4 INJECTION, SOLUTION INTRAMUSCULAR; INTRAVENOUS; SUBCUTANEOUS
Status: DISCONTINUED | OUTPATIENT
Start: 2021-07-15 | End: 2021-07-15 | Stop reason: HOSPADM

## 2021-07-15 RX ORDER — MEPERIDINE HYDROCHLORIDE 25 MG/ML
12.5 INJECTION INTRAMUSCULAR; INTRAVENOUS; SUBCUTANEOUS
Status: DISCONTINUED | OUTPATIENT
Start: 2021-07-15 | End: 2021-07-15 | Stop reason: HOSPADM

## 2021-07-15 RX ORDER — LIDOCAINE HYDROCHLORIDE 20 MG/ML
INJECTION, SOLUTION EPIDURAL; INFILTRATION; INTRACAUDAL; PERINEURAL PRN
Status: DISCONTINUED | OUTPATIENT
Start: 2021-07-15 | End: 2021-07-15 | Stop reason: SURG

## 2021-07-15 RX ORDER — DIPHENHYDRAMINE HYDROCHLORIDE 50 MG/ML
12.5 INJECTION INTRAMUSCULAR; INTRAVENOUS
Status: DISCONTINUED | OUTPATIENT
Start: 2021-07-15 | End: 2021-07-15 | Stop reason: HOSPADM

## 2021-07-15 RX ORDER — METOPROLOL TARTRATE 1 MG/ML
1 INJECTION, SOLUTION INTRAVENOUS
Status: DISCONTINUED | OUTPATIENT
Start: 2021-07-15 | End: 2021-07-15 | Stop reason: HOSPADM

## 2021-07-15 RX ORDER — OXYCODONE HCL 5 MG/5 ML
5 SOLUTION, ORAL ORAL
Status: DISCONTINUED | OUTPATIENT
Start: 2021-07-15 | End: 2021-07-15 | Stop reason: HOSPADM

## 2021-07-15 RX ORDER — ONDANSETRON 2 MG/ML
4 INJECTION INTRAMUSCULAR; INTRAVENOUS
Status: DISCONTINUED | OUTPATIENT
Start: 2021-07-15 | End: 2021-07-15 | Stop reason: HOSPADM

## 2021-07-15 RX ORDER — OXYCODONE HCL 5 MG/5 ML
10 SOLUTION, ORAL ORAL
Status: DISCONTINUED | OUTPATIENT
Start: 2021-07-15 | End: 2021-07-15 | Stop reason: HOSPADM

## 2021-07-15 RX ORDER — HYDROMORPHONE HYDROCHLORIDE 1 MG/ML
0.2 INJECTION, SOLUTION INTRAMUSCULAR; INTRAVENOUS; SUBCUTANEOUS
Status: DISCONTINUED | OUTPATIENT
Start: 2021-07-15 | End: 2021-07-15 | Stop reason: HOSPADM

## 2021-07-15 RX ORDER — MIDAZOLAM HYDROCHLORIDE 1 MG/ML
1 INJECTION INTRAMUSCULAR; INTRAVENOUS
Status: DISCONTINUED | OUTPATIENT
Start: 2021-07-15 | End: 2021-07-15 | Stop reason: HOSPADM

## 2021-07-15 RX ORDER — HALOPERIDOL 5 MG/ML
1 INJECTION INTRAMUSCULAR
Status: DISCONTINUED | OUTPATIENT
Start: 2021-07-15 | End: 2021-07-15 | Stop reason: HOSPADM

## 2021-07-15 RX ORDER — HYDROMORPHONE HYDROCHLORIDE 1 MG/ML
0.1 INJECTION, SOLUTION INTRAMUSCULAR; INTRAVENOUS; SUBCUTANEOUS
Status: DISCONTINUED | OUTPATIENT
Start: 2021-07-15 | End: 2021-07-15 | Stop reason: HOSPADM

## 2021-07-15 RX ADMIN — PROPOFOL 50 MG: 10 INJECTION, EMULSION INTRAVENOUS at 13:28

## 2021-07-15 RX ADMIN — LIDOCAINE HYDROCHLORIDE 25 MG: 20 INJECTION, SOLUTION EPIDURAL; INFILTRATION; INTRACAUDAL; PERINEURAL at 13:24

## 2021-07-15 RX ADMIN — LIDOCAINE HYDROCHLORIDE 25 MG: 20 INJECTION, SOLUTION EPIDURAL; INFILTRATION; INTRACAUDAL; PERINEURAL at 13:28

## 2021-07-15 RX ADMIN — LIDOCAINE HYDROCHLORIDE 0.5 ML: 10 INJECTION, SOLUTION INFILTRATION; PERINEURAL at 12:06

## 2021-07-15 RX ADMIN — PROPOFOL 50 MG: 10 INJECTION, EMULSION INTRAVENOUS at 13:24

## 2021-07-15 RX ADMIN — PROPOFOL 100 MG: 10 INJECTION, EMULSION INTRAVENOUS at 13:20

## 2021-07-15 RX ADMIN — LIDOCAINE HYDROCHLORIDE 50 MG: 20 INJECTION, SOLUTION EPIDURAL; INFILTRATION; INTRACAUDAL; PERINEURAL at 13:20

## 2021-07-15 RX ADMIN — FENTANYL CITRATE 50 MCG: 50 INJECTION, SOLUTION INTRAMUSCULAR; INTRAVENOUS at 13:20

## 2021-07-15 RX ADMIN — WATER 15 ML: 100 IRRIGANT IRRIGATION at 12:07

## 2021-07-15 RX ADMIN — SODIUM CHLORIDE, POTASSIUM CHLORIDE, SODIUM LACTATE AND CALCIUM CHLORIDE: 600; 310; 30; 20 INJECTION, SOLUTION INTRAVENOUS at 12:06

## 2021-07-15 ASSESSMENT — ENCOUNTER SYMPTOMS
FEVER: 0
HEMOPTYSIS: 0
MUSCULOSKELETAL NEGATIVE: 1
PALPITATIONS: 0
CHILLS: 0
GASTROINTESTINAL NEGATIVE: 1
COUGH: 1
WEIGHT LOSS: 0
SPUTUM PRODUCTION: 0
SHORTNESS OF BREATH: 0
ORTHOPNEA: 0

## 2021-07-15 ASSESSMENT — FIBROSIS 4 INDEX: FIB4 SCORE: 1.42

## 2021-07-15 NOTE — ASSESSMENT & PLAN NOTE
-- Evidenced of bronchiectasis findings with patchy alveolar filling process and tree in bud opacities    -- Ddx includes inflammatory vs recurrent infection vs malignancy   -- Discussed about risks and benefits of undergoing bronchoscopy with BAL and possible biopsy. Risks discussed include but were not limited to bleeding, pneumothorax, respiratory failure requiring ventilatory support, infection, and possible death. Questions were encouraged and answered. Patient endorsed understanding and agreed to proceed with it.   -- Further recommendations pending bronchoscopy results

## 2021-07-15 NOTE — ANESTHESIA POSTPROCEDURE EVALUATION
Patient: Martinez Lopez    Procedure Summary     Date: 07/15/21 Room / Location:  PROCEDURE ROOM / SURGERY [facility]    Anesthesia Start: 1314 Anesthesia Stop: 1345    Procedure: BRONCHOSCOPY - FIBER OPTIC WITH BRONCHOALVEOLAR LAVAGE, Diagnosis: (ABNORMAL CT OF THE CHEST, HEMOPTYSIS; pending pathology)    Surgeons: Adam Larry M.D. Responsible Provider: Aj Ragland M.D.    Anesthesia Type: general ASA Status: 2          Final Anesthesia Type: general  Last vitals  BP   Blood Pressure: (!) 91/56    Temp   36.2 °C (97.2 °F)    Pulse   67   Resp   16    SpO2   98 %      Anesthesia Post Evaluation    Patient location during evaluation: PACU  Patient participation: complete - patient participated  Level of consciousness: awake and alert    Airway patency: patent  Anesthetic complications: no  Cardiovascular status: hemodynamically stable  Respiratory status: acceptable  Hydration status: euvolemic    PONV: none          No complications documented.     Nurse Pain Score: 0 (NPRS)

## 2021-07-15 NOTE — OR NURSING
1434 Pt arrived from PACU post Bronch , report received. Pt states pain is tollerable and denies nausea at this time. Pt dressing @ BS with assistance.    1452 Discharge instructions and medications gone over with Pt and ride. All questions answered. Pt meets DC criteria. PIV DC'd. Pt transported to POV via WC in stable condition.

## 2021-07-15 NOTE — ANESTHESIA TIME REPORT
Anesthesia Start and Stop Event Times     Date Time Event    7/15/2021 1314 Anesthesia Start     1345 Anesthesia Stop        Responsible Staff  07/15/21    Name Role Begin End    Aj Ragland M.D. Anesth 1314 1345        Preop Diagnosis (Free Text):  Pre-op Diagnosis     ABNORMAL CT OF THE CHEST, HEMOPTYSIS        Preop Diagnosis (Codes):    Post op Diagnosis  Hemoptysis      Premium Reason  Non-Premium    Comments:

## 2021-07-15 NOTE — PROCEDURES
Fiberoptic Bronchoscopy/Endotracheal Ultrasound(EBUS)    Date/Time of Procedure: 7/15/2021    Indication(s): Abnormal CT chest     Consent: Informed, written consent was obtained prior to the procedure; risks, benefits, & alternatives were discussed.    Universal Protocol/Time Out: A Time Out with checklist was performed prior to the procedure to ensure correct identification of the patient and procedure.    Pre-Procedure Diagnosis: Abnormal CT chest     Sedation/Analgesia/Anesthesia: Sedation as per anesthesia.    Additional Modalities:    [_] Fluoroscopy  [_] Radial Ultrasound  [_] Linear Endobronchial Ultrasound  [_] Rapid On-Site Evaluation  [_] Other: _    Route of Entry:  [_] Nasal [_] Oral [_] ET tube [_] Trach [X] LMA     Findings: After the patient is adequately anesthetized (see procedure for anesthesia), the bronchoscopy was passed via LMA.  The arytenoid and epiglottis appears normal in size. The vocal cords were visualized and opens to breathing. The trachea is of normal caliber. The clara is sharp. The tracheobronchial tree of the right lung was examined to at least the first subsegment level. Bronchial mucousa appears hyperemia with thick brown secretions seen in RML and RUL which was all suctioned. No endobronchial lesions visualized. The bronchoscope was wedged into RML and instilled 30 mL and retrieved back 20 mL of cloudy pink fluid. The bronchoscope was retracted back to the clara. At the entrance of the left mainstem there is bloody secretions seen which was suctioned. The tracheobronchial tree of the left lung was examined to at least the first subsegment level. The bronchial mucousa appears hyperemia in nature. There appears to be some white power at the level of LC2 which was easily suctioned. No endobronchial lesions. Bloody secretions were all suctioned. The bronchoscope was wedged into lingula segment. Sequential BAL is performed by instilling and retrieving three aliquots of 20 to 30 mL  sterile saline from that subsegmental bronchus. No evidence of progressively bloody serial aliquot seen.     Post procedure time out performed to confirmed specimen A (BAL - RML) and B (BAL- lingula).              Specimens: _  [X] Bronchoalveolar Lavage (BAL): RML and lingula   [_] Wash: _  [_] Brush: _  [_] Transbronchial Needle Aspiration (TBNA): _  [_] Endobronchial Biopsy (Endo): _  [_] Transbronchial Biopsy (TBBx): _  [_] Other: _  [_] Veran Navigational Bronchoscopy:   [_] Endobronchial Ultrasound (EBUS) TBNA: _    Estimated Blood Loss in mL: Minimum     Patient Response to Procedure: [X] Patient tolerated the procedure well. [_] Other    Complications: None     Post-Procedure Diagnosis: Abnormal CT chest     Post Procedure Follow Up/Comments: Follow up final culture and cytology. Patient will need close follow up in pulmonary clinic in 2 weeks with repeat CT chest. If the ill alveolar filling process continues to increase in size then will need navigational vs CT guided biopsy. Updated patient's daughter Birttney about bronchoscopy findings and future follow up plan. She verbalized understanding and agree with plan.      Adam Larry MD   Pulmonary Critical Care   [facility]

## 2021-07-15 NOTE — OR NURSING
Pt brought back to PreOp, RN assumed care, All PreOp tasks complete-see Epic for further details. Used  line with patient and daughter-consents signed, questions answered

## 2021-07-15 NOTE — H&P
Pulmonary History & Physical Note    Date of Service  7/15/2021    Primary Care Physician  Dontrell Farias M.D.    Consultants  None     Code Status  No Order    Chief Complaint  Bronchoscopy     History of Presenting Illness  Martinez Lopez is a 51 y.o. female who presented 7/15/2021 for an elective bronchoscopy. History obtained via patient's daughter Brittney at bedside. Patient with no significant past medical history who was seen in pulmonary clinic for hemoptysis evaluation. CT chest 10/2020 showed bronchiectasis with tree in bud opacities and multifocal alveolar filling process. Patient has been ruled out for TB via AFB X3 along with a negative QFT. IgE 7, normal immunoglobulin levels, normal TSH, PCT normal, GOYO negative, and MPO negative.      Subjectively patient reports feeling fine. She had a few episodes of hemoptysis back in May but has resolved on its own. Denies productive cough, N/V, fever/chills, or abdominal pain. Patient is a never smoker. Family history significant for mother with some form of unknown lung diease.      Review of Systems  Review of Systems   Constitutional: Negative for chills, fever, malaise/fatigue and weight loss.   Respiratory: Positive for cough. Negative for hemoptysis, sputum production and shortness of breath.    Cardiovascular: Negative for chest pain, palpitations and orthopnea.   Gastrointestinal: Negative.    Genitourinary: Negative.    Musculoskeletal: Negative.    All other systems reviewed and are negative.      Past Medical History   has a past medical history of Pain (2021).    Surgical History   has a past surgical history that includes tubal coagulation laparoscopic bilateral (1994).     Family History  family history includes Cancer (age of onset: 65) in her maternal grandmother; Cancer (age of onset: 72) in her paternal uncle; Lung Disease in her mother.   Family history reviewed with patient. There is family history that is pertinent to the chief  complaint.     Social History   reports that she has never smoked. She has never used smokeless tobacco. She reports that she does not drink alcohol and does not use drugs.    Allergies  No Known Allergies    Medications  Prior to Admission Medications   Prescriptions Last Dose Informant Patient Reported? Taking?   Multiple Vitamins-Minerals (ANTIOXIDANT VITAMINS PO) 7/1/2021  Yes No   Sig: Take  by mouth.   NON SPECIFIED 7/1/2021  Yes Yes   Sig: Procosol; 3 tablets per day   NON SPECIFIED 7/1/2021  Yes Yes   Sig: Magnecal D; 2 tablets per day   NON SPECIFIED 7/1/2021  Yes Yes   Sig: Biomega; 2 tablets per day   Non Formulary Request 7/1/2021  Yes Yes   Sig: Core Mineral; 2 tablets per day   diclofenac sodium 1 % Gel 6/15/2021  No No   Sig: Place 3 g on the skin 4 times a day as needed (pain).   Patient not taking: Reported on 6/30/2021      Facility-Administered Medications: None       Physical Exam  Temp:  [36 °C (96.8 °F)] 36 °C (96.8 °F)  Pulse:  [55] 55  Resp:  [16] 16  BP: (95)/(62) 95/62  SpO2:  [96 %] 96 %    Physical Exam  Constitutional:       Appearance: Normal appearance.   HENT:      Head: Normocephalic.      Nose: Nose normal.   Eyes:      Extraocular Movements: Extraocular movements intact.      Pupils: Pupils are equal, round, and reactive to light.   Cardiovascular:      Rate and Rhythm: Normal rate and regular rhythm.      Pulses: Normal pulses.   Pulmonary:      Effort: Pulmonary effort is normal.      Breath sounds: Normal breath sounds.   Abdominal:      General: Abdomen is flat. Bowel sounds are normal.      Palpations: Abdomen is soft.   Musculoskeletal:         General: Normal range of motion.      Cervical back: Normal range of motion.      Right lower leg: No edema.      Left lower leg: No edema.   Neurological:      Mental Status: She is alert and oriented to person, place, and time.      Cranial Nerves: No cranial nerve deficit.   Psychiatric:         Mood and Affect: Mood normal.          Laboratory:          No results for input(s): ALTSGPT, ASTSGOT, ALKPHOSPHAT, TBILIRUBIN, DBILIRUBIN, GAMMAGT, AMYLASE, LIPASE, ALB, PREALBUMIN, GLUCOSE in the last 72 hours.      No results for input(s): NTPROBNP in the last 72 hours.      No results for input(s): TROPONINT in the last 72 hours.    Imaging:  DX-PORTABLE FLUOROSCOPY < 1 HOUR Is the patient pregnant? Unknown    (Results Pending)   DX-CHEST-LIMITED (1 VIEW)    (Results Pending)         Assessment/Plan:    Abnormal CT of the chest  Assessment & Plan  -- Evidenced of bronchiectasis findings with patchy alveolar filling process and tree in bud opacities    -- Ddx includes inflammatory vs recurrent infection vs malignancy   -- Discussed about risks and benefits of undergoing bronchoscopy with BAL and possible biopsy. Risks discussed include but were not limited to bleeding, pneumothorax, respiratory failure requiring ventilatory support, infection, and possible death. Questions were encouraged and answered. Patient endorsed understanding and agreed to proceed with it.   -- Further recommendations pending bronchoscopy results           VTE prophylaxis: SCDs/TEDs

## 2021-07-15 NOTE — DISCHARGE INSTRUCTIONS
Bronchoscopy Discharge Instructions  Home Care Instructions    ACTIVITY: Rest and take it easy for the first 24 hours.  A responsible adult is recommended to remain with you during that time.  It is normal to feel sleepy.  We encourage you to not do anything that requires balance, judgment or coordination.    The medicine you had during the bronchoscopy will make you sleepy.    FOR 24 HOURS DO NOT:  Drive, operate machinery or run household appliances.  Drink beer or alcoholic beverages.  Make important decisions or sign legal documents.  Engage in activity that requires sharp judgment and reflexes for 24 hours    SPECIAL INSTRUCTIONS:    -Call you doctor and go to the ER if you are coughing up more than 2 tablespoons of bright red blood.   -Call your doctor and go to the ER if you experience acute onset of shortness of breath and/or increased chest pain.   -Call your doctor and go to the ER if you develop a fever greater than 101.5 degrees Fahrenheit.   -Follow-up in pulmonary clinic in 2 weeks, call to schedule.    Bronchoscopy is a procedure to look inside your windpipe and bronchial tubes.  An anesthetic solution is sprayed in your throat to make it numb.    You may experience a mild sore throat, hoarseness, fever up to 101?F, and /or coughing up small amounts of blood immediately following your bronchoscopy, especially if a biopsy was performed.  The discomfort should subside in 24-48 hours.    Do not smoke for 6-8 hours after the procedure to decrease your risk of coughing and /or bleeding.    Do not drink fluids or eat until your gag reflex returns, for two hours after the bronchoscopy.  Otherwise you will not feel the food or fluid in your throat, and it may go down your windpipe and cause you to choke.    Take ice chips or slowly sip cool fluids to make sure your gag reflex has returned.  Avoid hot fluids from the microwave for several hours.    After 2 hours or when you get home you may take throat  lozenges or gargle with salt water if your throat is sore.  Drink liquids to help dryness in your mouth and throat.    Resume your normal activities the following day.    MEDICATIONS: Resume taking daily medication as directed by your doctor.      A follow-up appointment should be arranged with your doctor in 1 week to get the results of the bronchoscopy and any tests done during the procedure; call to schedule.*    You should CALL YOUR PHYSICIAN if you develop:  You are wheezing  You develop any unusual signs or symptoms or have any questions                You should call 911 if you develop problems with breathing or chest pain.    If you are unable to contact your doctor or surgical center, you should go to the nearest emergency room or urgent care center.  Physician's telephone #: [phone]      If any questions arise, call your doctor.  If your doctor is not available, please feel free to call the Surgical Center at [phone].  The Center is open Monday through Friday from 7AM to 7PM.  You can also call the [facility] HOTLINE open 24 hours/day, 7 days/week and speak to a nurse at [phone], or toll free at [phone].    You may receive a survey in the mail within the next two weeks and we ask that you take a few moments to complete the survey and return it to us.  Our goal is to provide you with very good care and we value your comments.

## 2021-07-15 NOTE — OR NURSING
1339 To PACU from OR via amy, sleeping, respirations spontaneous and non-labored via LMA-dc'd by anesthesia on admit to pacu    1350 pt coughed up blood-tinged mucous    1353 Dr Larry here to see pt     1410 called pt daughter with update, daughter translate over phone that pt denies discomfort and encourage deep breathing    1420 awake/alert, vss, called report to marcio valdez phase 2    1434 transferred to phase 2 in stable condition via amy w/cna

## 2021-07-15 NOTE — ANESTHESIA PROCEDURE NOTES
Airway    Date/Time: 7/15/2021 1:21 PM  Performed by: Aj Ragland M.D.  Authorized by: Aj Ragland M.D.     Location:  OR  Urgency:  Elective  Difficult Airway: No    Indications for Airway Management:  Anesthesia      Spontaneous Ventilation: absent    Sedation Level:  Deep  Preoxygenated: Yes    Mask Difficulty Assessment:  1 - vent by mask  Final Airway Type:  Supraglottic airway  Final Supraglottic Airway:  Standard LMA    SGA Size:  3  Number of Attempts at Approach:  1

## 2021-07-16 LAB
CYTOLOGY REG CYTOL: NORMAL
GRAM STN SPEC: NORMAL
GRAM STN SPEC: NORMAL
RHODAMINE-AURAMINE STN SPEC: NORMAL
RHODAMINE-AURAMINE STN SPEC: NORMAL
SIGNIFICANT IND 70042: NORMAL
SITE SITE: NORMAL
SOURCE SOURCE: NORMAL

## 2021-07-17 LAB
% CD3 - BAL Q5871: 80 %
% CD3 - BAL Q5871: 85 %
% CD4 - BAL Q5872: 0 %
% CD4 - BAL Q5872: 50 %
% CD8 - BAL Q5873: 24 %
% CD8 - BAL Q5873: 25 %
BACTERIA SPEC RESP CULT: NORMAL
BACTERIA SPEC RESP CULT: NORMAL
CD4:CD8 RATIO - BAL Q5874: 0 RATIO
CD4:CD8 RATIO - BAL Q5874: 2.08 RATIO
GRAM STN SPEC: NORMAL
GRAM STN SPEC: NORMAL
P JIROVECII AG SPEC QL IF: NEGATIVE
P JIROVECII AG SPEC QL IF: NEGATIVE
SIGNIFICANT IND 70042: NORMAL
SIGNIFICANT IND 70042: NORMAL
SITE SITE: NORMAL
SITE SITE: NORMAL
SOURCE SOURCE: NORMAL
SOURCE SOURCE: NORMAL
SPECIMEN SOURCE: NORMAL

## 2021-07-18 LAB
FUNGUS SPEC FUNGUS STN: NORMAL
FUNGUS SPEC FUNGUS STN: NORMAL
SIGNIFICANT IND 70042: NORMAL
SIGNIFICANT IND 70042: NORMAL
SITE SITE: NORMAL
SITE SITE: NORMAL
SOURCE SOURCE: NORMAL
SOURCE SOURCE: NORMAL

## 2021-07-20 LAB
P JIROVECII DNA SPEC QL NAA+PROBE: NOT DETECTED
P JIROVECII DNA SPEC QL NAA+PROBE: NOT DETECTED
SPECIMEN SOURCE: NORMAL
SPECIMEN SOURCE: NORMAL

## 2021-07-23 NOTE — PROGRESS NOTES
Critical left from micro.   From lingual lavage sample - growing  acid fast bacilli   Will sent [facility] for confirmation.   Seems like pt will follow up with pulmonary in 2 weeks.   Will message to Dr Akins regards this information.

## 2021-07-26 DIAGNOSIS — A31.9 MYCOBACTERIUM INFECTION: ICD-10-CM

## 2021-07-26 NOTE — PROGRESS NOTES
Called and updated patient and her daughter Brittney about BAL results growing AFB. Patient is scheduled for 7/28 to follow up in pulmonary clinic. Given positive AFB results pending identification, will reschedule pulmonary follow up visit in 2 weeks. In the meantime, will place referral to ID for further treatment evaluation. They verbalized understanding and agree with plan.     Adam Larry MD   Pulmonary Critical Care   [facility]

## 2021-08-10 LAB
MYCOBACTERIUM SPEC CULT: ABNORMAL
MYCOBACTERIUM SPEC CULT: ABNORMAL
RHODAMINE-AURAMINE STN SPEC: ABNORMAL
SIGNIFICANT IND 70042: ABNORMAL
SITE SITE: ABNORMAL
SOURCE SOURCE: ABNORMAL

## 2021-08-11 ENCOUNTER — OFFICE VISIT (OUTPATIENT)
Dept: INFECTIOUS DISEASES | Facility: MEDICAL CENTER | Age: 51
End: 2021-08-11
Payer: COMMERCIAL

## 2021-08-11 VITALS
RESPIRATION RATE: 16 BRPM | WEIGHT: 102.6 LBS | BODY MASS INDEX: 17.09 KG/M2 | HEART RATE: 75 BPM | OXYGEN SATURATION: 95 % | HEIGHT: 65 IN | TEMPERATURE: 99.2 F | SYSTOLIC BLOOD PRESSURE: 102 MMHG | DIASTOLIC BLOOD PRESSURE: 62 MMHG

## 2021-08-11 DIAGNOSIS — R04.2 HEMOPTYSIS: ICD-10-CM

## 2021-08-11 DIAGNOSIS — A31.0 PULMONARY MYCOBACTERIUM AVIUM COMPLEX (MAC) INFECTION (HCC): ICD-10-CM

## 2021-08-11 DIAGNOSIS — R93.89 ABNORMAL CT OF THE CHEST: ICD-10-CM

## 2021-08-11 LAB
FUNGUS SPEC CULT: NORMAL
FUNGUS SPEC CULT: NORMAL
FUNGUS SPEC FUNGUS STN: NORMAL
FUNGUS SPEC FUNGUS STN: NORMAL
SIGNIFICANT IND 70042: NORMAL
SIGNIFICANT IND 70042: NORMAL
SITE SITE: NORMAL
SITE SITE: NORMAL
SOURCE SOURCE: NORMAL
SOURCE SOURCE: NORMAL

## 2021-08-11 PROCEDURE — 99205 OFFICE O/P NEW HI 60 MIN: CPT | Performed by: INTERNAL MEDICINE

## 2021-08-11 ASSESSMENT — FIBROSIS 4 INDEX: FIB4 SCORE: 1.42

## 2021-08-11 NOTE — PROGRESS NOTES
[facility] INFECTIOUS DISEASES CLINIC NOTE     Date of Service: 8/11/2021    Referring Physician: Adam Larry M.D.  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 female, Mandarin speaking, here due to positive MICHELLE from recent BAL.  History obtained with the help of an official . Patient with no known significant past medical history, non-smoker, not on any regular medications, originally from [place], speaks Mandarin, moved to the [place] about 15 years ago.  Patient had hemoptysis, that she first experienced once in 2016, without any other associated complaints.  She was then fine till July 2020 when she had another episode, and then 2 additional episodes in September.  She would cough up approximately 1 to 3 teaspoons at a time.  CT chest was performed in October 2020 which showed nodular opacities in bilateral upper lobes and bronchiectatic changes right middle lobe, left lingula and posterior left lower lobe.  QuantiFERON gold was negative and AFB sputum x3 were negative.  IgE levels were normal, and no significant routine labs were abnormal.  Procalcitonin was negative.  Patient was seen by pulmonology in November 2020, GOYO and ANCA were negative, plan was made to proceed with bronchoscopy.  This was performed on 7/15/2021, culture turned positive for MICHELLE.    Since that time, patient states that she has only had another episode of hemoptysis once in May 2021, and has had none since.  She has no cough, no shortness of breath on exertion, and states that her weight has been stable even for the past decade.  No fevers or chills.    Review of Systems:  All other systems reviewed and are negative expect as noted in HPI    Past Medical History:   Diagnosis Date   • Pain 2021    bilateral elbow pain from lifting at work       Past Surgical History:   Procedure  Laterality Date   • PB BRONCHOSCOPY,DIAGNOSTIC  7/15/2021    Procedure: BRONCHOSCOPY - FIBER OPTIC WITH BRONCHOALVEOLAR LAVAGE,;  Surgeon: Adam Larry M.D.;  Location: SURGERY [facility];  Service: Ent   • TUBAL COAGULATION LAPAROSCOPIC BILATERAL  1994       Family History   Problem Relation Age of Onset   • Lung Disease Mother    • Cancer Paternal Uncle 72        Throat cancer    • Cancer Maternal Grandmother 65        Breast cancer       Social History     Socioeconomic History   • Marital status:      Spouse name: Not on file   • Number of children: Not on file   • Years of education: Not on file   • Highest education level: Not on file   Occupational History     Comment:    Tobacco Use   • Smoking status: Never Smoker   • Smokeless tobacco: Never Used   Vaping Use   • Vaping Use: Never used   Substance and Sexual Activity   • Alcohol use: No   • Drug use: No   • Sexual activity: Not on file   Other Topics Concern   •  Service No   • Blood Transfusions No   • Caffeine Concern No   • Occupational Exposure No   • Hobby Hazards No   • Sleep Concern No   • Stress Concern No   • Weight Concern No   • Special Diet Not Asked   • Back Care No   • Exercise No   • Bike Helmet No   • Seat Belt Yes   • Self-Exams No   Social History Narrative   • Not on file     Social Determinants of Health     Financial Resource Strain:    • Difficulty of Paying Living Expenses:    Food Insecurity:    • Worried About Running Out of Food in the Last Year:    • Ran Out of Food in the Last Year:    Transportation Needs:    • Lack of Transportation (Medical):    • Lack of Transportation (Non-Medical):    Physical Activity:    • Days of Exercise per Week:    • Minutes of Exercise per Session:    Stress:    • Feeling of Stress :    Social Connections:    • Frequency of Communication with Friends and Family:    • Frequency of Social Gatherings with Friends and Family:    • Attends Voodoo Services:    • Active Member of  "Clubs or Organizations:    • Attends Club or Organization Meetings:    • Marital Status:    Intimate Partner Violence:    • Fear of Current or Ex-Partner:    • Emotionally Abused:    • Physically Abused:    • Sexually Abused:        No Known Allergies    Medications:  Current Outpatient Medications on File Prior to Visit   Medication Sig Dispense Refill   • Multiple Vitamins-Minerals (ANTIOXIDANT VITAMINS PO) Take  by mouth.     • Non Formulary Request Core Mineral; 2 tablets per day     • NON SPECIFIED Procosol; 3 tablets per day     • NON SPECIFIED Magnecal D; 2 tablets per day     • NON SPECIFIED Biomega; 2 tablets per day     • diclofenac sodium 1 % Gel Place 3 g on the skin 4 times a day as needed (pain). (Patient not taking: Reported on 6/30/2021) 100 g 3     No current facility-administered medications on file prior to visit.       Physical Exam:   Vital Signs: /62 (BP Location: Left arm, Patient Position: Sitting, BP Cuff Size: Adult)   Pulse 75   Temp 37.3 °C (99.2 °F) (Temporal)   Resp 16   Ht 1.651 m (5' 5\") Comment: patient reported  Wt 46.5 kg (102 lb 9.6 oz)   SpO2 95%   BMI 17.07 kg/m²   Vital signs reviewed  Constitutional: Patient is oriented to person, place, and time. Appears thin and in no acute distress  Head: Atraumatic, normocephalic  Eyes: Conjunctivae normal, EOM intact  Mouth/Throat: Wearing a mask  Neck: Neck supple. No masses/lymphadenopathy  Cardiovascular: Normal rate, regular rhythm, normal S1S2 and intact distal pulses. No murmur, gallop, or friction rub. No pedal edema.  Pulmonary/Chest: No respiratory distress. Unlabored respiratory effort, lungs clear to auscultation. No wheezes or rales.   Abdominal: Soft, non tender. BS + x 4. No masses or hepatosplenomegaly.   Musculoskeletal: No joint tenderness, swelling, erythema, or restriction of motion noted.  Neurological: Alert and oriented to person, place, and time. No gross cranial nerve deficit. No focal neural deficit " noted  Skin: Skin is warm and dry. No rashes or embolic phenomena noted on exposed skin  Psychiatric: Normal mood and affect. Behavior is normal.     LABS:  WBC   Date/Time Value Ref Range Status   10/15/2020 11:02 AM 3.8 (L) 4.8 - 10.8 K/uL Final     RBC   Date/Time Value Ref Range Status   10/15/2020 11:02 AM 4.51 4.20 - 5.40 M/uL Final     Hemoglobin   Date/Time Value Ref Range Status   10/15/2020 11:02 AM 13.7 12.0 - 16.0 g/dL Final     Hematocrit   Date/Time Value Ref Range Status   10/15/2020 11:02 AM 42.7 37.0 - 47.0 % Final     MCV   Date/Time Value Ref Range Status   10/15/2020 11:02 AM 94.7 81.4 - 97.8 fL Final     MCH   Date/Time Value Ref Range Status   10/15/2020 11:02 AM 30.4 27.0 - 33.0 pg Final     MCHC   Date/Time Value Ref Range Status   10/15/2020 11:02 AM 32.1 (L) 33.6 - 35.0 g/dL Final     MPV   Date/Time Value Ref Range Status   10/15/2020 11:02 AM 10.6 9.0 - 12.9 fL Final     Sodium   Date/Time Value Ref Range Status   10/15/2020 11:02  135 - 145 mmol/L Final     Potassium   Date/Time Value Ref Range Status   10/15/2020 11:02 AM 4.1 3.6 - 5.5 mmol/L Final     Chloride   Date/Time Value Ref Range Status   10/15/2020 11:02  96 - 112 mmol/L Final     Co2   Date/Time Value Ref Range Status   10/15/2020 11:02 AM 32 20 - 33 mmol/L Final     Glucose   Date/Time Value Ref Range Status   10/15/2020 11:02 AM 82 65 - 99 mg/dL Final     Bun   Date/Time Value Ref Range Status   10/15/2020 11:02 AM 15 8 - 22 mg/dL Final     Creatinine   Date/Time Value Ref Range Status   10/15/2020 11:02 AM 0.41 (L) 0.50 - 1.40 mg/dL Final     Alkaline Phosphatase   Date/Time Value Ref Range Status   10/15/2020 11:02 AM 44 30 - 99 U/L Final     AST(SGOT)   Date/Time Value Ref Range Status   10/15/2020 11:02 AM 23 12 - 45 U/L Final     ALT(SGPT)   Date/Time Value Ref Range Status   10/15/2020 11:02 AM 18 2 - 50 U/L Final     Total Bilirubin   Date/Time Value Ref Range Status   10/15/2020 11:02 AM 0.8 0.1 - 1.5  mg/dL Final      No results found for: CPKTOTAL     MICRO:  No results found for: BLOODCULTU, BLDCULT, BCHOLD      Latest pertinent labs were reviewed    IMAGING STUDIES:  As above    Assessment:   Martinez Lopez is a 51 y.o. female with a history of few episodes of hemoptysis approximately 5 times since 2016, with CT scan findings concerning for bilateral pulmonary nodules and bronchiectatic changes, BAL positive for MICHELLE.  The CT scan is from October 2020, patient states she has not had another episode of hemoptysis since May 2021.  She also notes no cough, no shortness of breath, no fevers or chills, no weight loss.     Pertinent Diagnoses:  Pulmonary MICHELLE infection  Abnormal CT scan findings  History of hemoptysis  Low BMI    Plan:   -After discussion, patient prefers to hold off on MICHELLE antibiotic therapy given that she is currently asymptomatic.  Discussed that if she develops hemoptysis again, or develops any other symptoms, will need to reassess this  -Recommend repeat CT scan of the chest to look for any progression of previously noted lesions.  Patient states she prefers to get this in October  -In the meantime, spoke with Microbiology lab and requested that the positive MICHELLE be sent to [facility] for further identification and susceptibilities    Plan was discussed with Dr. Medina from pulmonology    ID clinic follow-up in 2 to 3 months after repeat CT scan, or sooner if symptoms return    Aj Rachel M.D.    Please note that this dictation was created using voice recognition software. I have worked with technical experts from [facility]  [employer] to optimize the interface.  I have made every reasonable attempt to correct obvious errors, but there may be errors of grammar and possibly content that I did not discover before finalizing the note.

## 2021-10-06 ENCOUNTER — HOSPITAL ENCOUNTER (OUTPATIENT)
Dept: RADIOLOGY | Facility: MEDICAL CENTER | Age: 51
End: 2021-10-06
Attending: INTERNAL MEDICINE
Payer: COMMERCIAL

## 2021-10-06 DIAGNOSIS — R04.2 HEMOPTYSIS: ICD-10-CM

## 2021-10-06 DIAGNOSIS — A31.0 PULMONARY MYCOBACTERIUM AVIUM COMPLEX (MAC) INFECTION (HCC): ICD-10-CM

## 2021-10-06 PROCEDURE — 71250 CT THORAX DX C-: CPT

## 2021-10-12 LAB — TEST NAME 95000: NORMAL

## 2021-10-18 LAB
MISC LAB RSLT 95002: NORMAL
TEST NAME 95000: NORMAL

## 2021-12-22 ENCOUNTER — OFFICE VISIT (OUTPATIENT)
Dept: INFECTIOUS DISEASES | Facility: MEDICAL CENTER | Age: 51
End: 2021-12-22
Payer: COMMERCIAL

## 2021-12-22 VITALS
SYSTOLIC BLOOD PRESSURE: 106 MMHG | WEIGHT: 104.2 LBS | TEMPERATURE: 98.4 F | DIASTOLIC BLOOD PRESSURE: 74 MMHG | OXYGEN SATURATION: 94 % | HEART RATE: 76 BPM | BODY MASS INDEX: 17.36 KG/M2 | HEIGHT: 65 IN | RESPIRATION RATE: 16 BRPM

## 2021-12-22 DIAGNOSIS — R93.89 ABNORMAL CT OF THE CHEST: ICD-10-CM

## 2021-12-22 DIAGNOSIS — A31.0 PULMONARY MYCOBACTERIUM AVIUM COMPLEX (MAC) INFECTION (HCC): ICD-10-CM

## 2021-12-22 DIAGNOSIS — R04.2 HEMOPTYSIS: ICD-10-CM

## 2021-12-22 PROCEDURE — 99213 OFFICE O/P EST LOW 20 MIN: CPT | Performed by: INTERNAL MEDICINE

## 2021-12-22 ASSESSMENT — FIBROSIS 4 INDEX: FIB4 SCORE: 1.42

## 2021-12-22 NOTE — PROGRESS NOTES
Infectious Disease Clinic    Subjective:     Chief Complaint   Patient presents with   • Follow-Up     Hemoptysis     Martinez Lopez is a pleasant 51 y.o. female, Mandarin speaking, initially seen for  positive MICHELLE from recent BAL.  Patient with no known significant past medical history, non-smoker, not on any regular medications, originally from [place], speaks Mandarin, moved to the [place] about [place] years ago.  Patient had hemoptysis, that she first experienced once in 2016, without any other associated complaints.  She was then fine till July 2020 when she had another episode, and then 2 additional episodes in September.  She would cough up approximately 1 to 3 teaspoons at a time.  CT chest was performed in October 2020 which showed nodular opacities in bilateral upper lobes and bronchiectatic changes right middle lobe, left lingula and posterior left lower lobe.  QuantiFERON gold was negative and AFB sputum x3 were negative.  IgE levels were normal, and no significant routine labs were abnormal.  Procalcitonin was negative.  Patient was seen by pulmonology in November 2020, GOYO and ANCA were negative, plan was made to proceed with bronchoscopy.  This was performed on 7/15/2021, culture turned positive for MICHELLE.  Cultures were sent to [facility] [facility] for identification speciation and has now grown Mycobacterium Marsallense which is a staph species of microtrauma avium.  Susceptibilities were also obtained and similar as would be expected with Mycobacterium avium.      Since that time, patient states that she has only had another episode of hemoptysis once in May 2021, and has had none since.  She has no cough, no shortness of breath on exertion, and states that her weight has been stable even for the past decade.  No fevers or chills.       Today, 12/22/2021: Patient reports feeling well. Denies feeling generally ill, fevers/chills, general malaise, headache, n/v/d, abdominal pain, chest pain or  shortness of breath.  She has had no cough, no further episodes of hemoptysis, no fevers, no night sweats and no weight loss.  CT chest from 10/21 notes markedly abnormal lung parenchyma with numerous areas of complex reticulonodular, fibrotic, multicystic/bronchitic opacities with progression since prior.  Findings compatible with Mycobacterium avium, active tuberculosis or fungal infection.      ROS  As documented above in my HPI.    Past Medical History:   Diagnosis Date   • Pain 2021    bilateral elbow pain from lifting at work       Social History     Tobacco Use   • Smoking status: Never Smoker   • Smokeless tobacco: Never Used   Vaping Use   • Vaping Use: Never used   Substance Use Topics   • Alcohol use: No   • Drug use: No       Allergies: Patient has no known allergies.    Pt's medication and problem list reviewed.     Objective:     PE:  There were no vitals taken for this visit.    Vital signs reviewed    Constitutional: Appears well-developed and well-nourished. No acute distress.  Speech fluent.    Eyes: Conjunctivae normal and EOM are normal. Pupils are equal, round, and reactive to light.   ENMT: Lips without lesions, good dentition.  Oropharynx is clear and moist.  Neck: Trachea midline. Normal range of motion. Neck supple. No masses.  No JVD.  Cardiovascular: Normal rate, regular rhythm, normal heart sounds and intact distal pulses. No murmur, gallop, or friction rub. No edema.  Respiratory: No respiratory distress, unlabored respiratory effort.  Lungs clear to auscultation bilaterally. No wheezes or rales.   Abdomen: Soft, non tender, non-distended. BS + x 4. No masses or hepatosplenomegaly.   Musculoskeletal: Steady gait.  Normal range of motion.  No joint or bone tenderness, swelling, erythema or deformity.  No clubbing or cyanosis.  Skin: Warm and dry. Good turgor. No visible rashes or lesions.  Neurological: No cranial nerve deficit. Coordination normal.  Sensation intact.  Psychiatric: Alert  and oriented to person, place, and time. Normal mood, calm affect.  Normal behavior and judgment.     Labs:  WBC   Date/Time Value Ref Range Status   10/15/2020 11:02 AM 3.8 (L) 4.8 - 10.8 K/uL Final     RBC   Date/Time Value Ref Range Status   10/15/2020 11:02 AM 4.51 4.20 - 5.40 M/uL Final     Hemoglobin   Date/Time Value Ref Range Status   10/15/2020 11:02 AM 13.7 12.0 - 16.0 g/dL Final     Hematocrit   Date/Time Value Ref Range Status   10/15/2020 11:02 AM 42.7 37.0 - 47.0 % Final     MCV   Date/Time Value Ref Range Status   10/15/2020 11:02 AM 94.7 81.4 - 97.8 fL Final     MCH   Date/Time Value Ref Range Status   10/15/2020 11:02 AM 30.4 27.0 - 33.0 pg Final     MCHC   Date/Time Value Ref Range Status   10/15/2020 11:02 AM 32.1 (L) 33.6 - 35.0 g/dL Final     MPV   Date/Time Value Ref Range Status   10/15/2020 11:02 AM 10.6 9.0 - 12.9 fL Final        Sodium   Date/Time Value Ref Range Status   10/15/2020 11:02  135 - 145 mmol/L Final     Potassium   Date/Time Value Ref Range Status   10/15/2020 11:02 AM 4.1 3.6 - 5.5 mmol/L Final     Chloride   Date/Time Value Ref Range Status   10/15/2020 11:02  96 - 112 mmol/L Final     Co2   Date/Time Value Ref Range Status   10/15/2020 11:02 AM 32 20 - 33 mmol/L Final     Glucose   Date/Time Value Ref Range Status   10/15/2020 11:02 AM 82 65 - 99 mg/dL Final     Bun   Date/Time Value Ref Range Status   10/15/2020 11:02 AM 15 8 - 22 mg/dL Final     Creatinine   Date/Time Value Ref Range Status   10/15/2020 11:02 AM 0.41 (L) 0.50 - 1.40 mg/dL Final       Alkaline Phosphatase   Date/Time Value Ref Range Status   10/15/2020 11:02 AM 44 30 - 99 U/L Final     AST(SGOT)   Date/Time Value Ref Range Status   10/15/2020 11:02 AM 23 12 - 45 U/L Final     ALT(SGPT)   Date/Time Value Ref Range Status   10/15/2020 11:02 AM 18 2 - 50 U/L Final     Total Bilirubin   Date/Time Value Ref Range Status   10/15/2020 11:02 AM 0.8 0.1 - 1.5 mg/dL Final        No results found for:  CPKTOTAL     Assessment and Plan:   The following treatment plan was discussed with patient at length:    1. 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 Abnormal CT of the chest     3. Hemoptysis       --- Patient remains entirely asymptomatic with no further episodes of hemoptysis.    --- CT chest is concerning with bilateral multinodule infiltrates which are complex reticulonodular, probiotic, multicystic and bronchiectatic.    --- [facility] are uploaded under media tab  --- She is declining treatment at this time but I did discuss with her the potential for progression of the disease and that it was unlikely to resolve given the extent of the lung disease.  I did explain that she would be on a 3 drug regimen should we treat for an extended period time, anticipated over 1 year, and would need work-up prior to starting including hearing exam, eye test, labs etc. and periodically to safely and properly administer these antibiotics.  Patient understood and her daughter was also in the room who expressed understanding.  --- We will repeat CT chest in January see progression over 3 months.  If generally stable and she does not wish to be treated could likely repeat CT at or extent intervals 6-12 months and monitor fornew symptoms  --- Recommend if she has any further episodes of mopped assist, increasing shortness of breath, new cough new fevers or weight loss to notify ID    Follow up: 6 weeks, RTC sooner if needed. FU with PCP for ongoing chronic medical conditions.     Jeanne Gautam M.D.       Please note that this dictation was created using voice recognition software. I have  worked with technical experts from [employer] to optimize the interface.  I have made every reasonable attempt to correct obvious errors, but there may be errors of grammar and possibly content that I did not discover before finalizing the note.

## 2022-02-16 ENCOUNTER — APPOINTMENT (OUTPATIENT)
Dept: INFECTIOUS DISEASES | Facility: MEDICAL CENTER | Age: 52
End: 2022-02-16
Payer: COMMERCIAL

## 2022-03-10 ENCOUNTER — HOSPITAL ENCOUNTER (OUTPATIENT)
Dept: RADIOLOGY | Facility: MEDICAL CENTER | Age: 52
End: 2022-03-10
Attending: INTERNAL MEDICINE
Payer: COMMERCIAL

## 2022-03-10 DIAGNOSIS — A31.0 PULMONARY MYCOBACTERIUM AVIUM COMPLEX (MAC) INFECTION (HCC): ICD-10-CM

## 2022-03-10 PROCEDURE — 71250 CT THORAX DX C-: CPT

## 2022-03-30 ENCOUNTER — OFFICE VISIT (OUTPATIENT)
Dept: INFECTIOUS DISEASES | Facility: MEDICAL CENTER | Age: 52
End: 2022-03-30
Payer: COMMERCIAL

## 2022-03-30 VITALS
BODY MASS INDEX: 17.58 KG/M2 | OXYGEN SATURATION: 95 % | SYSTOLIC BLOOD PRESSURE: 98 MMHG | WEIGHT: 103 LBS | TEMPERATURE: 98.1 F | DIASTOLIC BLOOD PRESSURE: 74 MMHG | RESPIRATION RATE: 16 BRPM | HEART RATE: 67 BPM | HEIGHT: 64 IN

## 2022-03-30 DIAGNOSIS — R04.2 HEMOPTYSIS: ICD-10-CM

## 2022-03-30 DIAGNOSIS — R93.89 ABNORMAL CT OF THE CHEST: ICD-10-CM

## 2022-03-30 DIAGNOSIS — A31.0 PULMONARY MYCOBACTERIUM AVIUM COMPLEX (MAC) INFECTION (HCC): ICD-10-CM

## 2022-03-30 PROCEDURE — 99213 OFFICE O/P EST LOW 20 MIN: CPT | Performed by: INTERNAL MEDICINE

## 2022-03-30 ASSESSMENT — FIBROSIS 4 INDEX: FIB4 SCORE: 1.45

## 2022-03-30 NOTE — PROGRESS NOTES
Infectious Disease Clinic    Subjective:     Chief Complaint   Patient presents with   • Follow-Up      Hemoptysis         Martinez Lopez is a pleasant 51 y.o. female, Mandarin speaking, initially seen for  positive MICHELLE from recent BAL.  Patient with no known significant past medical history, non-smoker, not on any regular medications, originally from [place], speaks Mandarin, [place] ago.  Patient had hemoptysis, that she first experienced once in 2016, without any other associated complaints.  She was then fine till July 2020 when she had another episode, and then 2 additional episodes in September.  She would cough up approximately 1 to 3 teaspoons at a time.  CT chest was performed in October 2020 which showed nodular opacities in bilateral upper lobes and bronchiectatic changes right middle lobe, left lingula and posterior left lower lobe.  QuantiFERON gold was negative and AFB sputum x3 were negative.  IgE levels were normal, and no significant routine labs were abnormal.  Procalcitonin was negative.  Patient was seen by pulmonology in November 2020, GOYO and ANCA were negative, plan was made to proceed with bronchoscopy.  This was performed on 7/15/2021, culture turned positive for MICHELLE.  Cultures were sent to [facility] [facility] [facility] for identification speciation and has now grown Mycobacterium Marsallense which is a similar species to micobacterium avium.  Susceptibilities were also obtained and similar as would be expected with Mycobacterium avium.      Since that time, patient states that she has only had another episode of hemoptysis once in May 2021, and has had none since.  She has no cough, no shortness of breath on exertion, and states that her weight has been stable even for the past decade.  No fevers or chills.         12/22/2021: Patient reports feeling well. Denies feeling generally ill, fevers/chills, general malaise, headache, n/v/d, abdominal pain, chest pain or  "shortness of breath.  She has had no cough, no fevers, no night sweats and no weight loss.   CT chest from 10/21 notes markedly abnormal lung parenchyma with numerous areas of complex reticulonodular, fibrotic, multicystic/bronchitic opacities with progression since prior.  Findings compatible with Mycobacterium avium, active tuberculosis or fungal infection.    She was seen previously by ID in clinic and as she is doing well and generally asymptomatic she elected to take a wait and watch approach rather than starting therapy.    Today, 3/30/2022: Patient reports feeling wel. Denies feeling generally ill, fevers/chills, general malaise, headache, n/v/d, abdominal pain, chest pain or shortness of breath.  She did have another episode of hemoptysis on 2/22/2022 .  Repeat  CT chest on 3/10/2020 notes stable multifocal nodular opacities throughout both lungs.  Cystic change and bronchiectasis involving the right middle lobe, lingula and right lower lobe appears stable.  Findings thought to be compatible with stated history of Mycobacterium avium complex.  No new nodular masses identified.       ROS  As documented above in my HPI.    Past Medical History:   Diagnosis Date   • Pain 2021    bilateral elbow pain from lifting at work       Social History     Tobacco Use   • Smoking status: Never Smoker   • Smokeless tobacco: Never Used   Vaping Use   • Vaping Use: Never used   Substance Use Topics   • Alcohol use: No   • Drug use: No       Allergies: Patient has no known allergies.    Pt's medication and problem list reviewed.     Objective:     PE:  BP (!) 98/74 (BP Location: Left arm, Patient Position: Sitting, BP Cuff Size: Adult)   Pulse 67   Temp 36.7 °C (98.1 °F) (Temporal)   Resp 16   Ht 1.626 m (5' 4\")   Wt 46.7 kg (103 lb)   SpO2 95%   BMI 17.68 kg/m²     Vital signs reviewed    Constitutional: Appears well-developed and well-nourished. No acute distress.  Speech fluent.    Eyes: Conjunctivae normal and EOM " are normal. Pupils are equal, round, and reactive to light.   ENMT: Lips without lesions, good dentition.  Oropharynx is clear and moist.  Neck: Trachea midline. Normal range of motion. Neck supple. No masses.  No JVD.  Cardiovascular: Normal rate, regular rhythm, normal heart sounds and intact distal pulses. No murmur, gallop, or friction rub. No edema.  Respiratory: No respiratory distress, unlabored respiratory effort.  Lungs clear to auscultation bilaterally. No wheezes or rales.   Abdomen: Soft, non tender, non-distended. BS + x 4. No masses or hepatosplenomegaly.   Musculoskeletal: Steady gait.  Normal range of motion.  No joint or bone tenderness, swelling, erythema or deformity.  No clubbing or cyanosis.  Skin: Warm and dry. Good turgor. No visible rashes or lesions.  Neurological: No cranial nerve deficit. Coordination normal.  Sensation intact.  Psychiatric: Alert and oriented to person, place, and time. Normal mood, calm affect.  Normal behavior and judgment.     Labs:  WBC   Date/Time Value Ref Range Status   10/15/2020 11:02 AM 3.8 (L) 4.8 - 10.8 K/uL Final     RBC   Date/Time Value Ref Range Status   10/15/2020 11:02 AM 4.51 4.20 - 5.40 M/uL Final     Hemoglobin   Date/Time Value Ref Range Status   10/15/2020 11:02 AM 13.7 12.0 - 16.0 g/dL Final     Hematocrit   Date/Time Value Ref Range Status   10/15/2020 11:02 AM 42.7 37.0 - 47.0 % Final     MCV   Date/Time Value Ref Range Status   10/15/2020 11:02 AM 94.7 81.4 - 97.8 fL Final     MCH   Date/Time Value Ref Range Status   10/15/2020 11:02 AM 30.4 27.0 - 33.0 pg Final     MCHC   Date/Time Value Ref Range Status   10/15/2020 11:02 AM 32.1 (L) 33.6 - 35.0 g/dL Final     MPV   Date/Time Value Ref Range Status   10/15/2020 11:02 AM 10.6 9.0 - 12.9 fL Final        Sodium   Date/Time Value Ref Range Status   10/15/2020 11:02  135 - 145 mmol/L Final     Potassium   Date/Time Value Ref Range Status   10/15/2020 11:02 AM 4.1 3.6 - 5.5 mmol/L Final      Chloride   Date/Time Value Ref Range Status   10/15/2020 11:02  96 - 112 mmol/L Final     Co2   Date/Time Value Ref Range Status   10/15/2020 11:02 AM 32 20 - 33 mmol/L Final     Glucose   Date/Time Value Ref Range Status   10/15/2020 11:02 AM 82 65 - 99 mg/dL Final     Bun   Date/Time Value Ref Range Status   10/15/2020 11:02 AM 15 8 - 22 mg/dL Final     Creatinine   Date/Time Value Ref Range Status   10/15/2020 11:02 AM 0.41 (L) 0.50 - 1.40 mg/dL Final       Alkaline Phosphatase   Date/Time Value Ref Range Status   10/15/2020 11:02 AM 44 30 - 99 U/L Final     AST(SGOT)   Date/Time Value Ref Range Status   10/15/2020 11:02 AM 23 12 - 45 U/L Final     ALT(SGPT)   Date/Time Value Ref Range Status   10/15/2020 11:02 AM 18 2 - 50 U/L Final     Total Bilirubin   Date/Time Value Ref Range Status   10/15/2020 11:02 AM 0.8 0.1 - 1.5 mg/dL Final        No results found for: CPKTOTAL     Assessment and Plan:   The following treatment plan was discussed with patient at length:    1. 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 Abnormal CT of the chest     3. Hemoptysis       NTM-PD, nodular/bronchiectasis disease- Mycobacterium marseillense (subspecies of Mycobacterium avium)    --- As per the 2020 American thoracic Society/European respiratory Society/European Society of clinical microbiology and IDSA the patient has met the following criteria for Nontuberculosis Mycobacterial  pulmonary disease    Pulmonary symptoms: Episodes of hemoptysis, most recently on 2/2/2022   Radiologic evidence: CT with bilateral nodules and bronchiectasis   Microbiologic criteria: BAL from bronchoscopy on 7/15/2021 + Mycobacterium avium  --- Reviewed speciation and susceptibilities obtained previously from [facility] lab (uploaded to media tab)  --- Will initiate treatment once she has baseline work-up as below:       --- azithromycin 500 mg 3 times weekly (M/W/F)   --- EKG -ordered  --- See audiology to establish baseline and follow-up with audiology every 3 months to ensure no change to indicate azithromycin sensorineural toxicity.   --- ethambutol 25 mg/kg 3 times weekly (M/W/F)   --- See ophthalmology for baseline screening, visual acuity test/color discrimination test and then repeat testing every 1-2 months while on ethambutol -she was seen approximately 1 week ago so will not need ophthalmology exam prior to starting ethambutol but will need exam within 1 month of starting the medication  ---rifampin 600 mg 3 times weekly (M/W/F) and monitor for any adverse effects.    --- Reviewed current medications for any interactions with rifampin- none   --- Labs to establish baseline, CMP and CBC.  Need repeat labs every 1 to 3 months while on rifampin.  --- Return to clinic in 4 weeks with work-up as above and then anticipate starting antibiotics  --- Anticipate will need another bronchoscopy BAL approximate 3 months after starting antibiotics to see if we have clearance as she does not produce any sputum       Jeanne Gautam M.D.       Please note that this dictation was created using voice recognition software. I have  worked with technical experts from [facility]  [employer] to optimize the interface.  I have made every reasonable attempt to correct obvious errors, but there may be errors of grammar and possibly content that I did not discover before finalizing the note.

## 2022-03-31 ENCOUNTER — HOSPITAL ENCOUNTER (OUTPATIENT)
Dept: LAB | Facility: MEDICAL CENTER | Age: 52
End: 2022-03-31
Attending: INTERNAL MEDICINE
Payer: COMMERCIAL

## 2022-03-31 DIAGNOSIS — A31.0 PULMONARY MYCOBACTERIUM AVIUM COMPLEX (MAC) INFECTION (HCC): ICD-10-CM

## 2022-03-31 LAB
ALBUMIN SERPL BCP-MCNC: 4.4 G/DL (ref 3.2–4.9)
ALBUMIN/GLOB SERPL: 1.9 G/DL
ALP SERPL-CCNC: 52 U/L (ref 30–99)
ALT SERPL-CCNC: 14 U/L (ref 2–50)
ANION GAP SERPL CALC-SCNC: 14 MMOL/L (ref 7–16)
AST SERPL-CCNC: 21 U/L (ref 12–45)
BASOPHILS # BLD AUTO: 1.1 % (ref 0–1.8)
BASOPHILS # BLD: 0.04 K/UL (ref 0–0.12)
BILIRUB SERPL-MCNC: 0.5 MG/DL (ref 0.1–1.5)
BUN SERPL-MCNC: 21 MG/DL (ref 8–22)
CALCIUM SERPL-MCNC: 9.4 MG/DL (ref 8.5–10.5)
CHLORIDE SERPL-SCNC: 104 MMOL/L (ref 96–112)
CO2 SERPL-SCNC: 25 MMOL/L (ref 20–33)
CREAT SERPL-MCNC: 0.55 MG/DL (ref 0.5–1.4)
EOSINOPHIL # BLD AUTO: 0.17 K/UL (ref 0–0.51)
EOSINOPHIL NFR BLD: 4.5 % (ref 0–6.9)
ERYTHROCYTE [DISTWIDTH] IN BLOOD BY AUTOMATED COUNT: 42.8 FL (ref 35.9–50)
GFR SERPLBLD CREATININE-BSD FMLA CKD-EPI: 109 ML/MIN/1.73 M 2
GLOBULIN SER CALC-MCNC: 2.3 G/DL (ref 1.9–3.5)
GLUCOSE SERPL-MCNC: 79 MG/DL (ref 65–99)
HCT VFR BLD AUTO: 42 % (ref 37–47)
HGB BLD-MCNC: 13.5 G/DL (ref 12–16)
IMM GRANULOCYTES # BLD AUTO: 0.01 K/UL (ref 0–0.11)
IMM GRANULOCYTES NFR BLD AUTO: 0.3 % (ref 0–0.9)
LYMPHOCYTES # BLD AUTO: 1.55 K/UL (ref 1–4.8)
LYMPHOCYTES NFR BLD: 41.4 % (ref 22–41)
MCH RBC QN AUTO: 29.9 PG (ref 27–33)
MCHC RBC AUTO-ENTMCNC: 32.1 G/DL (ref 33.6–35)
MCV RBC AUTO: 93.1 FL (ref 81.4–97.8)
MONOCYTES # BLD AUTO: 0.41 K/UL (ref 0–0.85)
MONOCYTES NFR BLD AUTO: 11 % (ref 0–13.4)
NEUTROPHILS # BLD AUTO: 1.56 K/UL (ref 2–7.15)
NEUTROPHILS NFR BLD: 41.7 % (ref 44–72)
NRBC # BLD AUTO: 0 K/UL
NRBC BLD-RTO: 0 /100 WBC
PLATELET # BLD AUTO: 176 K/UL (ref 164–446)
PMV BLD AUTO: 10.9 FL (ref 9–12.9)
POTASSIUM SERPL-SCNC: 3.8 MMOL/L (ref 3.6–5.5)
PROT SERPL-MCNC: 6.7 G/DL (ref 6–8.2)
RBC # BLD AUTO: 4.51 M/UL (ref 4.2–5.4)
SODIUM SERPL-SCNC: 143 MMOL/L (ref 135–145)
WBC # BLD AUTO: 3.7 K/UL (ref 4.8–10.8)

## 2022-03-31 PROCEDURE — 85025 COMPLETE CBC W/AUTO DIFF WBC: CPT

## 2022-03-31 PROCEDURE — 80053 COMPREHEN METABOLIC PANEL: CPT

## 2022-03-31 PROCEDURE — 36415 COLL VENOUS BLD VENIPUNCTURE: CPT

## 2023-09-17 ENCOUNTER — OFFICE VISIT (OUTPATIENT)
Dept: URGENT CARE | Facility: CLINIC | Age: 53
End: 2023-09-17
Payer: COMMERCIAL

## 2023-09-17 VITALS
RESPIRATION RATE: 16 BRPM | SYSTOLIC BLOOD PRESSURE: 110 MMHG | BODY MASS INDEX: 18.61 KG/M2 | TEMPERATURE: 97.2 F | WEIGHT: 109 LBS | HEART RATE: 57 BPM | HEIGHT: 64 IN | DIASTOLIC BLOOD PRESSURE: 64 MMHG | OXYGEN SATURATION: 96 %

## 2023-09-17 DIAGNOSIS — N30.01 ACUTE CYSTITIS WITH HEMATURIA: ICD-10-CM

## 2023-09-17 LAB
APPEARANCE UR: CLEAR
BILIRUB UR STRIP-MCNC: NEGATIVE MG/DL
COLOR UR AUTO: NORMAL
GLUCOSE UR STRIP.AUTO-MCNC: NEGATIVE MG/DL
KETONES UR STRIP.AUTO-MCNC: NEGATIVE MG/DL
LEUKOCYTE ESTERASE UR QL STRIP.AUTO: NORMAL
NITRITE UR QL STRIP.AUTO: NEGATIVE
PH UR STRIP.AUTO: 7.5 [PH] (ref 5–8)
PROT UR QL STRIP: NEGATIVE MG/DL
RBC UR QL AUTO: NORMAL
SP GR UR STRIP.AUTO: 1.02
UROBILINOGEN UR STRIP-MCNC: 0.2 MG/DL

## 2023-09-17 PROCEDURE — 81002 URINALYSIS NONAUTO W/O SCOPE: CPT | Performed by: NURSE PRACTITIONER

## 2023-09-17 PROCEDURE — 99213 OFFICE O/P EST LOW 20 MIN: CPT | Performed by: NURSE PRACTITIONER

## 2023-09-17 PROCEDURE — 3078F DIAST BP <80 MM HG: CPT | Performed by: NURSE PRACTITIONER

## 2023-09-17 PROCEDURE — 3074F SYST BP LT 130 MM HG: CPT | Performed by: NURSE PRACTITIONER

## 2023-09-17 RX ORDER — NITROFURANTOIN 25; 75 MG/1; MG/1
100 CAPSULE ORAL 2 TIMES DAILY
Qty: 10 CAPSULE | Refills: 0 | Status: SHIPPED | OUTPATIENT
Start: 2023-09-17 | End: 2023-09-22

## 2023-09-17 ASSESSMENT — ENCOUNTER SYMPTOMS
NAUSEA: 0
FLANK PAIN: 0
FEVER: 0
ABDOMINAL PAIN: 0
CHILLS: 0
VOMITING: 0

## 2023-09-17 ASSESSMENT — FIBROSIS 4 INDEX: FIB4 SCORE: 1.75

## 2023-09-17 NOTE — PROGRESS NOTES
Subjective:   Martinez Lopez is a 53 y.o. female who presents for Painful Urination (Patient has been having painful urination, and frequency for 4 days. Patient also noticed that her urine has been discolored since yesterday )  Daughter is translating for encounter.    UTI  This is a new problem. Episode onset: 4 days. The problem occurs constantly. The problem has been gradually worsening. Associated symptoms include urinary symptoms. Pertinent negatives include no abdominal pain, chills, fever, nausea or vomiting. She has tried drinking for the symptoms.       Review of Systems   Constitutional:  Negative for chills, fever and malaise/fatigue.   Gastrointestinal:  Negative for abdominal pain, nausea and vomiting.   Genitourinary:  Positive for dysuria, frequency and urgency. Negative for flank pain and hematuria.       Medications:    ANTIOXIDANT VITAMINS PO  diclofenac sodium Gel  nitrofurantoin Caps  Non Formulary Request  NON SPECIFIED    Allergies: Patient has no known allergies.    Problem List: Martinez Lopez does not have any pertinent problems on file.    Surgical History:  Past Surgical History:   Procedure Laterality Date    ND BRONCHOSCOPY,DIAGNOSTIC  7/15/2021    Procedure: BRONCHOSCOPY - FIBER OPTIC WITH BRONCHOALVEOLAR LAVAGE,;  Surgeon: Adam Larry M.D.;  Location: SURGERY [facility];  Service: Ent    TUBAL COAGULATION LAPAROSCOPIC BILATERAL  1994       Past Social Hx: Martinez Lopez  reports that she has never smoked. She has never used smokeless tobacco. She reports that she does not drink alcohol and does not use drugs.     Past Family Hx:  Martinez Lopez family history includes Cancer (age of onset: 65) in her maternal grandmother; Cancer (age of onset: 72) in her paternal uncle; Lung Disease in her mother.     Problem list, medications, and allergies reviewed by myself today in Epic.     Objective:     /64 (BP Location: Left arm, Patient Position: Sitting, BP Cuff Size: Adult)   Pulse (!) 57    "Temp 36.2 °C (97.2 °F) (Temporal)   Resp 16   Ht 1.626 m (5' 4\")   Wt 49.4 kg (109 lb)   SpO2 96%   BMI 18.71 kg/m²     Physical Exam  Constitutional:       Appearance: Normal appearance. She is not ill-appearing or toxic-appearing.   HENT:      Head: Normocephalic.      Right Ear: External ear normal.      Left Ear: External ear normal.      Nose: Nose normal.      Mouth/Throat:      Lips: Pink.   Eyes:      General: Lids are normal.   Pulmonary:      Effort: Pulmonary effort is normal. No accessory muscle usage.   Abdominal:      Tenderness: There is no abdominal tenderness. There is no right CVA tenderness or left CVA tenderness.   Musculoskeletal:      Cervical back: Full passive range of motion without pain.   Neurological:      Mental Status: She is alert and oriented to person, place, and time.   Psychiatric:         Mood and Affect: Mood normal.         Thought Content: Thought content normal.         Assessment/Plan:     Diagnosis and associated orders:     1. Acute cystitis with hematuria  POCT Urinalysis    URINE CULTURE(NEW)    nitrofurantoin (MACROBID) 100 MG Cap         Comments/MDM:     Pt. Was given ABX therapy today and will change therapy if culture indicates this is necessary. ER precautions given- worsening symptoms, back pain, abd. Pain, or fevers.   Pt. Is to increase fluids, and take the complete duration of the therapy.   Differential diagnosis, natural history, supportive care, and indications for immediate follow-up discussed.                Please note that this dictation was created using voice recognition software. I have made a reasonable attempt to correct obvious errors, but I expect that there are errors of grammar and possibly content that I did not discover before finalizing the note.    This note was electronically signed by Leobardo LLOYD.    "

## 2024-05-21 ENCOUNTER — OFFICE VISIT (OUTPATIENT)
Dept: URGENT CARE | Facility: CLINIC | Age: 54
End: 2024-05-21
Payer: COMMERCIAL

## 2024-05-21 ENCOUNTER — APPOINTMENT (OUTPATIENT)
Dept: RADIOLOGY | Facility: IMAGING CENTER | Age: 54
End: 2024-05-21
Attending: NURSE PRACTITIONER
Payer: COMMERCIAL

## 2024-05-21 VITALS
HEIGHT: 65 IN | RESPIRATION RATE: 14 BRPM | BODY MASS INDEX: 18.24 KG/M2 | WEIGHT: 109.5 LBS | SYSTOLIC BLOOD PRESSURE: 100 MMHG | OXYGEN SATURATION: 96 % | TEMPERATURE: 97.5 F | HEART RATE: 68 BPM | DIASTOLIC BLOOD PRESSURE: 56 MMHG

## 2024-05-21 DIAGNOSIS — S69.91XA HAND INJURY, RIGHT, INITIAL ENCOUNTER: ICD-10-CM

## 2024-05-21 PROCEDURE — 3074F SYST BP LT 130 MM HG: CPT | Performed by: NURSE PRACTITIONER

## 2024-05-21 PROCEDURE — 3078F DIAST BP <80 MM HG: CPT | Performed by: NURSE PRACTITIONER

## 2024-05-21 PROCEDURE — 99213 OFFICE O/P EST LOW 20 MIN: CPT | Performed by: NURSE PRACTITIONER

## 2024-05-21 PROCEDURE — 73130 X-RAY EXAM OF HAND: CPT | Mod: TC,RT | Performed by: RADIOLOGY

## 2024-05-21 NOTE — PROGRESS NOTES
Chief Complaint   Patient presents with    Joint Injection     Finger was hurt back in February in the car and is still experiencing pain.       HISTORY OF PRESENT ILLNESS: Patient is a pleasant 54 y.o. female who presents to urgent care today with her daughter, both provide the history.  Patient states that she accidentally hit her right hand on the edge of her car door in February.  Since then she patient has had pain to her right hand, primarily fifth metacarpal and fifth digit.  She has not tried any medication for symptom relief.  Denies previous injuries to this hand.  She is right-hand dominant.    Patient Active Problem List    Diagnosis Date Noted    Pulmonary Mycobacterium avium complex (MAC) infection (HCC) 2021    Abnormal CT of the chest 07/15/2021    Hemoptysis 10/15/2020    Hematemesis without nausea 10/15/2020    Elbow pain, left 10/15/2020    Tired eyes 2017    Trapezius muscle spasm 2017       Allergies:Patient has no known allergies.    No current [facility]-ordered outpatient medications on file.     No current [facility]-ordered facility-administered medications on file.       Past Medical History:   Diagnosis Date    Pain     bilateral elbow pain from lifting at work       Social History     Tobacco Use    Smoking status: Never    Smokeless tobacco: Never   Vaping Use    Vaping status: Never Used   Substance Use Topics    Alcohol use: No    Drug use: No       Family Status   Relation Name Status    Mo          passed in 2018     Fa  Alive    Sis  Alive    Bro  Alive    PUnc      MGMo      MGFa      PGMo      PGFa      Bro  Alive    Bro  Alive    Sis  Alive    Bro  Alive     Family History   Problem Relation Age of Onset    Lung Disease Mother     Cancer Paternal Uncle 72        Throat cancer     Cancer Maternal Grandmother 65        Breast cancer       ROS:  Review of Systems   Constitutional: Negative for fever, chills, weight loss,  "malaise, and fatigue.   HENT: Negative for ear pain, nosebleeds, congestion, sore throat and neck pain.    Eyes: Negative for vision changes.   Neuro: Negative for headache, sensory changes, weakness, seizure, LOC.   Cardiovascular: Negative for chest pain, palpitations, orthopnea and leg swelling.   Respiratory: Negative for cough, sputum production, shortness of breath and wheezing.   Gastrointestinal: Negative for abdominal pain, nausea, vomiting or diarrhea.   Genitourinary: Negative for dysuria, urgency and frequency.  Musculoskeletal: Positive for right hand injury.  Negative for falls, neck pain, back pain, joint pain, myalgias.   Skin: Negative for rash, diaphoresis.     Exam:  /56 (BP Location: Left arm, Patient Position: Sitting)   Pulse 68   Temp 36.4 °C (97.5 °F) (Temporal)   Resp 14   Ht 1.651 m (5' 5\")   Wt 49.7 kg (109 lb 8 oz)   SpO2 96%   General: well-nourished, well-developed female in NAD  Head: normocephalic, atraumatic  Eyes: PERRLA, no conjunctival injection, acuity grossly intact, lids normal.  Ears: normal shape and symmetry, no tenderness, no discharge. External canals are without any significant edema or erythema. Tympanic membranes are without any inflammation, no effusion. Gross auditory acuity is intact.  Nose: symmetrical without tenderness, no discharge.  Mouth/Throat: reasonable hygiene, no erythema, exudates or tonsillar enlargement.  Neck: no masses, range of motion within normal limits, no tracheal deviation. No obvious thyroid enlargement.   Lymph: no cervical adenopathy. No supraclavicular adenopathy.   Neuro: alert and oriented. Cranial nerves 1-12 grossly intact. No sensory deficit.   Cardiovascular: regular rate and rhythm. No edema.  Pulmonary: no distress. Chest is symmetrical with respiration, no wheezes, crackles, or rhonchi.   Musculoskeletal: no clubbing, appropriate muscle tone, gait is stable.  Right hand: Normal in appearance, tenderness over fifth MCP.  " "Hand has full range of motion, including fingers.  Neurovascular appears intact.  Skin: warm, dry, intact, no clubbing, no cyanosis, no rashes.   Psych: appropriate mood, affect, judgement.       Dx right hand radiology reading \"No radiographic evidence of acute traumatic injury \"        Assessment/Plan:  1. Hand injury, right, initial encounter  DX-HAND 3+ RIGHT          Patient is a pleasant 54-year-old who presents with right hand injury after accidentally hitting the edge of her hand on the edge of her car door in February.  X-ray today is negative for acute process.  At this point, will place patient in aluminum brace and have her follow-up with PCP in 1 week.  RICE.  OTC NSAIDs encouraged.  Supportive care, differential diagnoses, and indications for immediate follow-up discussed with patient.   Pathogenesis of diagnosis discussed including typical length and natural progression.   Instructed to return to clinic or nearest emergency department for any change in condition, further concerns, or worsening of symptoms.  Patient states understanding of the plan of care and discharge instructions.  Instructed to make an appointment, for follow up, with her primary care provider.        Please note that this dictation was created using voice recognition software. I have made every reasonable attempt to correct obvious errors, but I expect that there are errors of grammar and possibly content that I did not discover before finalizing the note.      JUWAN Mayorga.     "

## 2025-07-17 ENCOUNTER — APPOINTMENT (OUTPATIENT)
Dept: MEDICAL GROUP | Facility: IMAGING CENTER | Age: 55
End: 2025-07-17
Payer: COMMERCIAL

## 2025-07-17 VITALS
RESPIRATION RATE: 14 BRPM | OXYGEN SATURATION: 94 % | WEIGHT: 105.6 LBS | HEART RATE: 78 BPM | BODY MASS INDEX: 18.03 KG/M2 | SYSTOLIC BLOOD PRESSURE: 98 MMHG | DIASTOLIC BLOOD PRESSURE: 50 MMHG | TEMPERATURE: 98.4 F | HEIGHT: 64 IN

## 2025-07-17 DIAGNOSIS — A31.0 PULMONARY MYCOBACTERIUM AVIUM COMPLEX (MAC) INFECTION (HCC): ICD-10-CM

## 2025-07-17 DIAGNOSIS — R04.2 HEMOPTYSIS: ICD-10-CM

## 2025-07-17 DIAGNOSIS — Z12.4 CERVICAL CANCER SCREENING: ICD-10-CM

## 2025-07-17 DIAGNOSIS — Z12.31 ENCOUNTER FOR SCREENING MAMMOGRAM FOR BREAST CANCER: ICD-10-CM

## 2025-07-17 DIAGNOSIS — R06.02 SHORTNESS OF BREATH: ICD-10-CM

## 2025-07-17 DIAGNOSIS — R05.3 CHRONIC COUGH: ICD-10-CM

## 2025-07-17 DIAGNOSIS — R94.31 ABNORMAL EKG: ICD-10-CM

## 2025-07-17 DIAGNOSIS — J47.9 BRONCHIECTASIS WITHOUT COMPLICATION (HCC): ICD-10-CM

## 2025-07-17 DIAGNOSIS — Z00.00 WELLNESS EXAMINATION: ICD-10-CM

## 2025-07-17 DIAGNOSIS — R93.89 ABNORMAL CHEST CT: ICD-10-CM

## 2025-07-17 DIAGNOSIS — Z13.6 SCREENING FOR CARDIOVASCULAR CONDITION: ICD-10-CM

## 2025-07-17 PROCEDURE — 3078F DIAST BP <80 MM HG: CPT | Performed by: INTERNAL MEDICINE

## 2025-07-17 PROCEDURE — 93000 ELECTROCARDIOGRAM COMPLETE: CPT | Performed by: INTERNAL MEDICINE

## 2025-07-17 PROCEDURE — 99214 OFFICE O/P EST MOD 30 MIN: CPT | Performed by: INTERNAL MEDICINE

## 2025-07-17 PROCEDURE — 3074F SYST BP LT 130 MM HG: CPT | Performed by: INTERNAL MEDICINE

## 2025-07-17 ASSESSMENT — PATIENT HEALTH QUESTIONNAIRE - PHQ9: CLINICAL INTERPRETATION OF PHQ2 SCORE: 0

## 2025-07-17 NOTE — PROGRESS NOTES
"Established Patient    Martinez Lopez is a 55 y.o. female who presents today with the following:    CC:   Chief Complaint   Patient presents with    Establish Care     Has seen Dr. Farias in past    Cough     Pt has report an ongoing cough since February, pt reports she has coughed up blood before    Shortness of Breath     Shortness of breath 2 weeks.    Hemoptysis     Intermittent for several months       HPI:     Verbal consent was acquired by the patient to use [employer] ambient listening note generation during this visit Yes     History of Present Illness  55-year-old female with pulmonary Mycobacterium avium complex presents with hemoptysis and chronic cough since February 2025. Accompanied by her daughter.    Hemoptysis and Chronic Cough  - Onset: Since February 2025.  - Character: Hemoptysis and chronic cough.  Sick contact with family members who had URI in Feb 2025    No recent out of country traveling.    Recurrent Hemoptysis and Shortness of Breath  Recurrent hemoptysis and shortness of breath for 2 weeks. No sinus symptoms, fever, chills, or sore throat.  - Onset: Recurrent for 2 weeks.  - Character: Hemoptysis and shortness of breath.  - Alleviating/Aggravating Factors: No sinus symptoms, fever, chills, or sore throat.    History of MAC, bronchiectasis, lost follow up with ID and pulmonology  Denies leg pain and swelling      No current outpatient medications on file.     Allergies[1]    Allergies, past medical history, past surgical history, medications, family history, social history reviewed and updated.    ROS Please see HPI    Physical Exam  Vitals: BP 98/50 (BP Location: Right arm, Patient Position: Sitting, BP Cuff Size: Small adult)   Pulse 78   Temp 36.9 °C (98.4 °F) (Temporal)   Resp 14   Ht 1.626 m (5' 4\")   Wt 47.9 kg (105 lb 9.6 oz)   SpO2 94%   BMI 18.13 kg/m²   Physical Exam  Constitutional:       Appearance: Normal appearance.   HENT:      Head: Normocephalic and atraumatic.      " Right Ear: External ear normal.      Left Ear: External ear normal.      Nose: Nose normal.      Mouth/Throat:      Pharynx: Oropharynx is clear.   Cardiovascular:      Rate and Rhythm: Normal rate and regular rhythm.      Pulses: Normal pulses.   Pulmonary:      Effort: Pulmonary effort is normal.      Breath sounds: Normal breath sounds.   Abdominal:      General: Bowel sounds are normal.      Palpations: Abdomen is soft.      Tenderness: There is no abdominal tenderness.   Musculoskeletal:      Cervical back: Neck supple.      Right lower leg: No edema.      Left lower leg: No edema.   Skin:     General: Skin is warm and dry.   Neurological:      Mental Status: She is alert. Mental status is at baseline.   Psychiatric:         Mood and Affect: Mood normal.         Behavior: Behavior normal.         Thought Content: Thought content normal.         Judgment: Judgment normal.         Assessment and Plan    Assessment & Plan      1. Chronic cough  Also has hemoptysis  History of MAC  - Referral to Pulmonary and Sleep Medicine  - CBC WITH DIFFERENTIAL; Future  - Comp Metabolic Panel; Future  - PROCALCITONIN; Future  - CT-CHEST (THORAX) WITH; Future  Chronic.  - Chronic cough since 02/2025.  - initiate work up  - OTC cough medication    2. Hemoptysis  - Referral to Pulmonary and Sleep Medicine  - CBC WITH DIFFERENTIAL; Future  - Comp Metabolic Panel; Future  - PROCALCITONIN; Future  - Prothrombin Time; Future  - APTT; Future  - CT-CHEST (THORAX) WITH; Future  - COD - Adult (Type and Screen); Future  Recurrent.  - Pulmonary Mycobacterium avium complex.  - Shortness of breath for 2 weeks.  - Order CT scan to evaluate hemoptysis.  - Further diagnostics based on CT results.  - Urgent referral to pulmonology    3. Shortness of breath  - EKG - Clinic Performed  - Referral to Pulmonary and Sleep Medicine  - CBC WITH DIFFERENTIAL; Future  - Comp Metabolic Panel; Future  - PROCALCITONIN; Future  - TROPONIN; Future  - proBrain  Natriuretic Peptide, NT; Future  - CT-CHEST (THORAX) WITH; Future  - EC-ECHOCARDIOGRAM COMPLETE W/O CONT; Future  - obtain pulse oxymetry, go to ER if oxygen saturation <90%    7/17/2025 4:06 pm  EKG Interpretation   To my reading today:  Rhythm: sinus rhythm  Rate: 68  Axis: P 78 QRS 63 T 42  Ectopy: none   Conduction:  QRSD 85    ST Segments: Unremarkable  T Waves: Unremarkable  Q Waves: none   Clinical Impression: Sinus rhythm and consider left ventricular hypertrophy    4. Pulmonary Mycobacterium avium complex (MAC) infection (HCC)  Hx of MAC, lost follow up with ID and pulmonology  - Referral to Pulmonary and Sleep Medicine  - CT-CHEST (THORAX) WITH; Future    5. Bronchiectasis without complication (HCC)  - Referral to Pulmonary and Sleep Medicine  - CBC WITH DIFFERENTIAL; Future  - Comp Metabolic Panel; Future  - CT-CHEST (THORAX) WITH; Future    6. Abnormal chest CT  - Referral to Pulmonary and Sleep Medicine  - CT-CHEST (THORAX) WITH; Future  3/10/2022 CT scan of the chest without contrast.     Stable multifocal nodular opacities throughout both lungs. Cystic change and bronchiectasis involving the right middle lobe, lingula and right lower lobe appears stable. Findings could be compatible with the stated history of mycobacterium avium complex   infection. No definitely new pulmonary nodule or mass is identified.    7. Abnormal EKG  7/17/2025 4:06 pm  EKG Interpretation   To my reading today:  Rhythm: sinus rhythm  Rate: 68  Axis: P 78 QRS 63 T 42  Ectopy: none   Conduction:  QRSD 85    ST Segments: Unremarkable  T Waves: Unremarkable  Q Waves: none   Clinical Impression: Sinus rhythm and consider left ventricular hypertrophy  - EC-ECHOCARDIOGRAM COMPLETE W/O CONT; Future    8. Encounter for screening mammogram for breast cancer  - MA-SCREENING MAMMO BILAT W/CAD; Future    9. Cervical cancer screening  - Referral to OB/Gyn    10. Screening for cardiovascular condition  -  Lipid Profile; Future    11. Wellness examination  - CBC WITH DIFFERENTIAL; Future  - Comp Metabolic Panel; Future  - TSH WITH REFLEX TO FT4; Future  - URINALYSIS,CULTURE IF INDICATED; Future          Follow-up:Return in about 3 weeks (around 8/7/2025), or if symptoms worsen or fail to improve.    This note was created using voice recognition software. There may be unintended errors in spelling, grammar or content.           [1] No Known Allergies

## 2025-07-18 ENCOUNTER — RESULTS FOLLOW-UP (OUTPATIENT)
Dept: MEDICAL GROUP | Facility: IMAGING CENTER | Age: 55
End: 2025-07-18

## 2025-07-18 ENCOUNTER — HOSPITAL ENCOUNTER (OUTPATIENT)
Dept: RADIOLOGY | Facility: MEDICAL CENTER | Age: 55
End: 2025-07-18
Attending: INTERNAL MEDICINE
Payer: COMMERCIAL

## 2025-07-18 DIAGNOSIS — R05.3 CHRONIC COUGH: ICD-10-CM

## 2025-07-18 DIAGNOSIS — A31.0 PULMONARY MYCOBACTERIUM AVIUM COMPLEX (MAC) INFECTION (HCC): ICD-10-CM

## 2025-07-18 DIAGNOSIS — R06.02 SHORTNESS OF BREATH: ICD-10-CM

## 2025-07-18 DIAGNOSIS — J47.9 BRONCHIECTASIS WITHOUT COMPLICATION (HCC): ICD-10-CM

## 2025-07-18 DIAGNOSIS — R04.2 HEMOPTYSIS: ICD-10-CM

## 2025-07-18 DIAGNOSIS — R93.89 ABNORMAL CHEST CT: ICD-10-CM

## 2025-07-18 PROCEDURE — 700117 HCHG RX CONTRAST REV CODE 255: Performed by: INTERNAL MEDICINE

## 2025-07-18 PROCEDURE — 71260 CT THORAX DX C+: CPT

## 2025-07-18 RX ADMIN — IOHEXOL 75 ML: 350 INJECTION, SOLUTION INTRAVENOUS at 18:58

## 2025-07-19 ENCOUNTER — HOSPITAL ENCOUNTER (OUTPATIENT)
Dept: LAB | Facility: MEDICAL CENTER | Age: 55
End: 2025-07-19
Attending: INTERNAL MEDICINE
Payer: COMMERCIAL

## 2025-07-19 DIAGNOSIS — Z00.00 WELLNESS EXAMINATION: ICD-10-CM

## 2025-07-19 DIAGNOSIS — R05.3 CHRONIC COUGH: ICD-10-CM

## 2025-07-19 DIAGNOSIS — R04.2 HEMOPTYSIS: ICD-10-CM

## 2025-07-19 DIAGNOSIS — Z13.6 SCREENING FOR CARDIOVASCULAR CONDITION: ICD-10-CM

## 2025-07-19 DIAGNOSIS — J47.9 BRONCHIECTASIS WITHOUT COMPLICATION (HCC): ICD-10-CM

## 2025-07-19 DIAGNOSIS — R06.02 SHORTNESS OF BREATH: ICD-10-CM

## 2025-07-19 LAB
ABO GROUP BLD: NORMAL
ALBUMIN SERPL BCP-MCNC: 3.9 G/DL (ref 3.2–4.9)
ALBUMIN/GLOB SERPL: 1.3 G/DL
ALP SERPL-CCNC: 50 U/L (ref 30–99)
ALT SERPL-CCNC: 13 U/L (ref 2–50)
ANION GAP SERPL CALC-SCNC: 12 MMOL/L (ref 7–16)
APPEARANCE UR: CLEAR
APTT PPP: 31.4 SEC (ref 24.7–36)
AST SERPL-CCNC: 20 U/L (ref 12–45)
BACTERIA #/AREA URNS HPF: NORMAL /HPF
BASOPHILS # BLD AUTO: 0.9 % (ref 0–1.8)
BASOPHILS # BLD: 0.05 K/UL (ref 0–0.12)
BILIRUB SERPL-MCNC: 0.4 MG/DL (ref 0.1–1.5)
BILIRUB UR QL STRIP.AUTO: NEGATIVE
BLD GP AB SCN SERPL QL: NORMAL
BUN SERPL-MCNC: 17 MG/DL (ref 8–22)
CALCIUM ALBUM COR SERPL-MCNC: 9.1 MG/DL (ref 8.5–10.5)
CALCIUM SERPL-MCNC: 9 MG/DL (ref 8.5–10.5)
CASTS URNS QL MICRO: NORMAL /LPF (ref 0–2)
CHLORIDE SERPL-SCNC: 106 MMOL/L (ref 96–112)
CHOLEST SERPL-MCNC: 158 MG/DL (ref 100–199)
CO2 SERPL-SCNC: 26 MMOL/L (ref 20–33)
COLOR UR: YELLOW
CREAT SERPL-MCNC: 0.59 MG/DL (ref 0.5–1.4)
EOSINOPHIL # BLD AUTO: 0.09 K/UL (ref 0–0.51)
EOSINOPHIL NFR BLD: 1.7 % (ref 0–6.9)
EPITHELIAL CELLS 1715: NORMAL /HPF (ref 0–5)
ERYTHROCYTE [DISTWIDTH] IN BLOOD BY AUTOMATED COUNT: 41.5 FL (ref 35.9–50)
GFR SERPLBLD CREATININE-BSD FMLA CKD-EPI: 105 ML/MIN/1.73 M 2
GLOBULIN SER CALC-MCNC: 3 G/DL (ref 1.9–3.5)
GLUCOSE SERPL-MCNC: 73 MG/DL (ref 65–99)
GLUCOSE UR STRIP.AUTO-MCNC: NEGATIVE MG/DL
HCT VFR BLD AUTO: 39.4 % (ref 37–47)
HDLC SERPL-MCNC: 50 MG/DL
HGB BLD-MCNC: 12.6 G/DL (ref 12–16)
IMM GRANULOCYTES # BLD AUTO: 0.02 K/UL (ref 0–0.11)
IMM GRANULOCYTES NFR BLD AUTO: 0.4 % (ref 0–0.9)
INR PPP: 1.06 (ref 0.87–1.13)
KETONES UR STRIP.AUTO-MCNC: NEGATIVE MG/DL
LDLC SERPL CALC-MCNC: 91 MG/DL
LEUKOCYTE ESTERASE UR QL STRIP.AUTO: ABNORMAL
LYMPHOCYTES # BLD AUTO: 2.55 K/UL (ref 1–4.8)
LYMPHOCYTES NFR BLD: 47.1 % (ref 22–41)
MCH RBC QN AUTO: 29.4 PG (ref 27–33)
MCHC RBC AUTO-ENTMCNC: 32 G/DL (ref 32.2–35.5)
MCV RBC AUTO: 92.1 FL (ref 81.4–97.8)
MICRO URNS: ABNORMAL
MONOCYTES # BLD AUTO: 0.44 K/UL (ref 0–0.85)
MONOCYTES NFR BLD AUTO: 8.1 % (ref 0–13.4)
NEUTROPHILS # BLD AUTO: 2.26 K/UL (ref 1.82–7.42)
NEUTROPHILS NFR BLD: 41.8 % (ref 44–72)
NITRITE UR QL STRIP.AUTO: NEGATIVE
NRBC # BLD AUTO: 0 K/UL
NRBC BLD-RTO: 0 /100 WBC (ref 0–0.2)
NT-PROBNP SERPL IA-MCNC: 274 PG/ML (ref 0–125)
PH UR STRIP.AUTO: 7 [PH] (ref 5–8)
PLATELET # BLD AUTO: 242 K/UL (ref 164–446)
PMV BLD AUTO: 9.9 FL (ref 9–12.9)
POTASSIUM SERPL-SCNC: 3.3 MMOL/L (ref 3.6–5.5)
PROCALCITONIN SERPL-MCNC: <0.05 NG/ML
PROT SERPL-MCNC: 6.9 G/DL (ref 6–8.2)
PROT UR QL STRIP: NEGATIVE MG/DL
PROTHROMBIN TIME: 13.8 SEC (ref 12–14.6)
RBC # BLD AUTO: 4.28 M/UL (ref 4.2–5.4)
RBC # URNS HPF: NORMAL /HPF (ref 0–2)
RBC UR QL AUTO: NEGATIVE
RH BLD: NORMAL
SODIUM SERPL-SCNC: 144 MMOL/L (ref 135–145)
SP GR UR STRIP.AUTO: 1.01
TRIGL SERPL-MCNC: 86 MG/DL (ref 0–149)
TROPONIN T SERPL-MCNC: <6 NG/L (ref 6–19)
TSH SERPL DL<=0.005 MIU/L-ACNC: 3.88 UIU/ML (ref 0.38–5.33)
UROBILINOGEN UR STRIP.AUTO-MCNC: 0.2 EU/DL
WBC # BLD AUTO: 5.4 K/UL (ref 4.8–10.8)
WBC #/AREA URNS HPF: NORMAL /HPF

## 2025-07-19 PROCEDURE — 84443 ASSAY THYROID STIM HORMONE: CPT

## 2025-07-19 PROCEDURE — 86900 BLOOD TYPING SEROLOGIC ABO: CPT

## 2025-07-19 PROCEDURE — 84484 ASSAY OF TROPONIN QUANT: CPT

## 2025-07-19 PROCEDURE — 80061 LIPID PANEL: CPT

## 2025-07-19 PROCEDURE — 83880 ASSAY OF NATRIURETIC PEPTIDE: CPT

## 2025-07-19 PROCEDURE — 85730 THROMBOPLASTIN TIME PARTIAL: CPT

## 2025-07-19 PROCEDURE — 85610 PROTHROMBIN TIME: CPT

## 2025-07-19 PROCEDURE — 81001 URINALYSIS AUTO W/SCOPE: CPT

## 2025-07-19 PROCEDURE — 85025 COMPLETE CBC W/AUTO DIFF WBC: CPT

## 2025-07-19 PROCEDURE — 36415 COLL VENOUS BLD VENIPUNCTURE: CPT

## 2025-07-19 PROCEDURE — 86850 RBC ANTIBODY SCREEN: CPT

## 2025-07-19 PROCEDURE — 86901 BLOOD TYPING SEROLOGIC RH(D): CPT

## 2025-07-19 PROCEDURE — 84145 PROCALCITONIN (PCT): CPT

## 2025-07-19 PROCEDURE — 80053 COMPREHEN METABOLIC PANEL: CPT

## 2025-07-20 DIAGNOSIS — E87.6 HYPOKALEMIA: Primary | ICD-10-CM

## 2025-07-20 RX ORDER — POTASSIUM CHLORIDE 750 MG/1
20 TABLET, EXTENDED RELEASE ORAL 2 TIMES DAILY
Qty: 28 TABLET | Refills: 0 | Status: SHIPPED | OUTPATIENT
Start: 2025-07-20 | End: 2025-07-27

## 2025-07-20 NOTE — PROGRESS NOTES
Hypokalemia  -     potassium chloride ER (KLOR-CON) 10 MEQ tablet; Take 2 Tablets by mouth 2 times a day for 7 days.

## 2025-07-23 NOTE — Clinical Note
REFERRAL APPROVAL NOTICE         Sent on July 23, 2025                   Martinez Lopez  [address]  [place] [address]                   Dear Ms. Lopez,    After a careful review of the medical information and benefit coverage, [facility] has processed your referral. See below for additional details.    If applicable, you must be actively enrolled with your insurance for coverage of the authorized service. If you have any questions regarding your coverage, please contact your insurance directly.    REFERRAL INFORMATION   Referral #:  [id number]  Referred-To Department    Referred-By Provider:  Pulmonary and Sleep Medicine    Dontrell Farias M.D.   [place] [facility]      [address] [place] [address]  [phone] [address] [place] [address] [address]  [place] [id number] [phone]    Referral Start Date:  07/17/2025  Referral End Date:   07/17/2026           SCHEDULING  If you do not already have an appointment, please call [phone] to make an appointment.   MORE INFORMATION  As a reminder, [facility] ownership has changed, meaning this location is now owned and operated by [facility]. As such, we want to clarify that our patients should expect to receive two separate bills for the services received at [facility] - one representing the [facility] facility fees as the owner of the establishment, and the other to represent the physician's services and subsequent fees. You can speak with your insurance carrier for a pricing estimate by calling the customer service number on the back of your card and ask about charges for a hospital outpatient visit.  If you do not already have a [employer] account, sign up at: [email or web address]  You can access your medical information, make appointments, see lab results, billing information,  and more.  If you have questions regarding this referral, please contact  the [facility] department at:             [phone]. Monday - Friday 7:30AM - 5:00PM.      Sincerely,  [facility]

## 2025-07-23 NOTE — Clinical Note
REFERRAL APPROVAL NOTICE         Sent on July 23, 2025                   Martinez Lopez  [address]  [place] [address]                   Dear Ms. Lopez,    After a careful review of the medical information and benefit coverage, [facility] has processed your referral. See below for additional details.    If applicable, you must be actively enrolled with your insurance for coverage of the authorized service. If you have any questions regarding your coverage, please contact your insurance directly.    REFERRAL INFORMATION   Referral #:  [id number]  Referred-To Provider    Referred-By Provider:  Obstetrics & Gynecology    Dontrell Farias M.D.   [address] [place] [address]  [phone] [address] [place] [address] [place] [id number] [phone]    Referral Start Date:  07/17/2025  Referral End Date:   07/17/2026             SCHEDULING  If you do not already have an appointment, please call [phone] to make an appointment.     MORE INFORMATION  If you do not already have a [employer] account, sign up at: [email or web address]  You can access your medical information, make appointments, see lab results, billing information, and more.  If you have questions regarding this referral, please contact  the [facility] Referrals department at:             [phone]. Monday - Friday 8:00AM - 5:00PM.     Sincerely,    [facility]

## 2025-08-05 ENCOUNTER — APPOINTMENT (OUTPATIENT)
Dept: MEDICAL GROUP | Facility: IMAGING CENTER | Age: 55
End: 2025-08-05
Payer: COMMERCIAL

## 2025-08-05 PROBLEM — R05.3 CHRONIC COUGH: Status: ACTIVE | Noted: 2025-08-05

## 2025-08-05 PROBLEM — R79.89 ELEVATED BRAIN NATRIURETIC PEPTIDE (BNP) LEVEL: Status: ACTIVE | Noted: 2025-08-05

## 2025-08-05 ASSESSMENT — FIBROSIS 4 INDEX: FIB4 SCORE: 1.31

## 2025-08-06 ENCOUNTER — HOSPITAL ENCOUNTER (OUTPATIENT)
Facility: MEDICAL CENTER | Age: 55
End: 2025-08-06
Attending: INTERNAL MEDICINE
Payer: COMMERCIAL

## 2025-08-06 ENCOUNTER — OFFICE VISIT (OUTPATIENT)
Dept: SLEEP MEDICINE | Facility: MEDICAL CENTER | Age: 55
End: 2025-08-06
Attending: INTERNAL MEDICINE
Payer: COMMERCIAL

## 2025-08-06 VITALS
BODY MASS INDEX: 19.22 KG/M2 | WEIGHT: 112 LBS | OXYGEN SATURATION: 95 % | HEART RATE: 72 BPM | SYSTOLIC BLOOD PRESSURE: 102 MMHG | DIASTOLIC BLOOD PRESSURE: 60 MMHG

## 2025-08-06 DIAGNOSIS — J47.0 BRONCHIECTASIS WITH ACUTE LOWER RESPIRATORY INFECTION (HCC): ICD-10-CM

## 2025-08-06 DIAGNOSIS — A31.0 PULMONARY MYCOBACTERIUM AVIUM COMPLEX (MAC) INFECTION (HCC): ICD-10-CM

## 2025-08-06 DIAGNOSIS — A31.0 NONTUBERCULOUS MYCOBACTERIAL DISEASE OF LUNG (HCC): Primary | ICD-10-CM

## 2025-08-06 PROCEDURE — 99215 OFFICE O/P EST HI 40 MIN: CPT | Performed by: INTERNAL MEDICINE

## 2025-08-06 PROCEDURE — 3078F DIAST BP <80 MM HG: CPT | Performed by: INTERNAL MEDICINE

## 2025-08-06 PROCEDURE — 3074F SYST BP LT 130 MM HG: CPT | Performed by: INTERNAL MEDICINE

## 2025-08-06 PROCEDURE — 99205 OFFICE O/P NEW HI 60 MIN: CPT | Performed by: INTERNAL MEDICINE

## 2025-08-06 PROCEDURE — 87116 MYCOBACTERIA CULTURE: CPT

## 2025-08-06 RX ORDER — SODIUM CHLORIDE FOR INHALATION 7 %
4 VIAL, NEBULIZER (ML) INHALATION 2 TIMES DAILY
Qty: 240 ML | Refills: 2 | Status: SHIPPED | OUTPATIENT
Start: 2025-08-06

## 2025-08-06 ASSESSMENT — ENCOUNTER SYMPTOMS
HEMOPTYSIS: 1
COUGH: 1
WEIGHT LOSS: 0
SHORTNESS OF BREATH: 1
SPUTUM PRODUCTION: 1
WEAKNESS: 1

## 2025-08-06 ASSESSMENT — FIBROSIS 4 INDEX: FIB4 SCORE: 1.31

## 2025-08-07 ENCOUNTER — HOSPITAL ENCOUNTER (OUTPATIENT)
Facility: MEDICAL CENTER | Age: 55
End: 2025-08-07
Attending: INTERNAL MEDICINE
Payer: COMMERCIAL

## 2025-08-07 DIAGNOSIS — J47.0 BRONCHIECTASIS WITH ACUTE LOWER RESPIRATORY INFECTION (HCC): ICD-10-CM

## 2025-08-07 DIAGNOSIS — A31.0 NONTUBERCULOUS MYCOBACTERIAL DISEASE OF LUNG (HCC): ICD-10-CM

## 2025-08-07 LAB
GRAM STN SPEC: NORMAL
SIGNIFICANT IND 70042: NORMAL
SITE SITE: NORMAL
SOURCE SOURCE: NORMAL

## 2025-08-07 PROCEDURE — 87205 SMEAR GRAM STAIN: CPT

## 2025-08-07 PROCEDURE — 87070 CULTURE OTHR SPECIMN AEROBIC: CPT

## 2025-08-09 LAB
BACTERIA SPEC RESP CULT: NORMAL
GRAM STN SPEC: NORMAL
PRELIMINARY RPT Q0601: NORMAL
RHODAMINE-AURAMINE STN SPEC: NORMAL
SIGNIFICANT IND 70042: NORMAL
SITE SITE: NORMAL
SOURCE SOURCE: NORMAL

## 2025-08-10 ENCOUNTER — HOSPITAL ENCOUNTER (OUTPATIENT)
Facility: MEDICAL CENTER | Age: 55
End: 2025-08-10
Attending: INTERNAL MEDICINE
Payer: COMMERCIAL

## 2025-08-10 PROCEDURE — 87116 MYCOBACTERIA CULTURE: CPT

## 2025-08-11 ENCOUNTER — HOSPITAL ENCOUNTER (OUTPATIENT)
Facility: MEDICAL CENTER | Age: 55
End: 2025-08-11
Attending: INTERNAL MEDICINE
Payer: COMMERCIAL

## 2025-08-11 DIAGNOSIS — A31.0 NONTUBERCULOUS MYCOBACTERIAL DISEASE OF LUNG (HCC): ICD-10-CM

## 2025-08-11 PROCEDURE — 87116 MYCOBACTERIA CULTURE: CPT

## 2025-08-12 ENCOUNTER — HOSPITAL ENCOUNTER (OUTPATIENT)
Facility: MEDICAL CENTER | Age: 55
End: 2025-08-12
Attending: INTERNAL MEDICINE
Payer: COMMERCIAL

## 2025-08-12 VITALS — HEIGHT: 62 IN | BODY MASS INDEX: 20.8 KG/M2 | WEIGHT: 113 LBS

## 2025-08-12 DIAGNOSIS — Z12.31 ENCOUNTER FOR SCREENING MAMMOGRAM FOR BREAST CANCER: ICD-10-CM

## 2025-08-12 PROCEDURE — 77067 SCR MAMMO BI INCL CAD: CPT

## 2025-08-12 ASSESSMENT — FIBROSIS 4 INDEX: FIB4 SCORE: 1.31

## 2025-08-13 LAB
PRELIMINARY RPT Q0601: NORMAL
PRELIMINARY RPT Q0601: NORMAL
RHODAMINE-AURAMINE STN SPEC: NORMAL
RHODAMINE-AURAMINE STN SPEC: NORMAL

## 2025-08-15 ENCOUNTER — TELEPHONE (OUTPATIENT)
Dept: SLEEP MEDICINE | Facility: MEDICAL CENTER | Age: 55
End: 2025-08-15
Payer: COMMERCIAL

## 2025-08-18 ENCOUNTER — RESULTS FOLLOW-UP (OUTPATIENT)
Dept: MEDICAL GROUP | Facility: IMAGING CENTER | Age: 55
End: 2025-08-18

## 2025-08-18 ENCOUNTER — ANCILLARY PROCEDURE (OUTPATIENT)
Dept: CARDIOLOGY | Facility: MEDICAL CENTER | Age: 55
End: 2025-08-18
Attending: INTERNAL MEDICINE
Payer: COMMERCIAL

## 2025-08-18 DIAGNOSIS — R06.02 SHORTNESS OF BREATH: ICD-10-CM

## 2025-08-18 DIAGNOSIS — R94.31 ABNORMAL EKG: ICD-10-CM

## 2025-08-18 LAB
LV EJECT FRACT  99904: 67
LV EJECT FRACT MOD 2C 99903: 66.19
LV EJECT FRACT MOD 4C 99902: 66.18
LV EJECT FRACT MOD BP 99901: 66.67

## 2025-08-18 PROCEDURE — 93306 TTE W/DOPPLER COMPLETE: CPT

## 2025-08-18 PROCEDURE — 93306 TTE W/DOPPLER COMPLETE: CPT | Mod: 26 | Performed by: INTERNAL MEDICINE

## 2025-08-25 ENCOUNTER — HOSPITAL ENCOUNTER (OUTPATIENT)
Dept: CARDIOLOGY | Facility: MEDICAL CENTER | Age: 55
End: 2025-08-25
Attending: INTERNAL MEDICINE
Payer: COMMERCIAL

## 2025-08-25 ENCOUNTER — HOSPITAL ENCOUNTER (OUTPATIENT)
Dept: LAB | Facility: MEDICAL CENTER | Age: 55
End: 2025-08-25
Attending: INTERNAL MEDICINE
Payer: COMMERCIAL

## 2025-08-25 ENCOUNTER — RESULTS FOLLOW-UP (OUTPATIENT)
Dept: MEDICAL GROUP | Facility: IMAGING CENTER | Age: 55
End: 2025-08-25

## 2025-08-25 DIAGNOSIS — E87.6 HYPOKALEMIA: ICD-10-CM

## 2025-08-25 DIAGNOSIS — A31.0 NONTUBERCULOUS MYCOBACTERIAL DISEASE OF LUNG (HCC): ICD-10-CM

## 2025-08-25 DIAGNOSIS — Z11.59 NEED FOR HEPATITIS C SCREENING TEST: ICD-10-CM

## 2025-08-25 DIAGNOSIS — Z11.59 NEED FOR HEPATITIS B SCREENING TEST: ICD-10-CM

## 2025-08-25 DIAGNOSIS — J47.0 BRONCHIECTASIS WITH ACUTE LOWER RESPIRATORY INFECTION (HCC): ICD-10-CM

## 2025-08-25 LAB
ALBUMIN SERPL BCP-MCNC: 4 G/DL (ref 3.2–4.9)
ALP SERPL-CCNC: 59 U/L (ref 30–99)
ALT SERPL-CCNC: 15 U/L (ref 2–50)
ANION GAP SERPL CALC-SCNC: 11 MMOL/L (ref 7–16)
ANION GAP SERPL CALC-SCNC: 11 MMOL/L (ref 7–16)
AST SERPL-CCNC: 24 U/L (ref 12–45)
BILIRUB CONJ SERPL-MCNC: 0.2 MG/DL (ref 0.1–0.5)
BILIRUB INDIRECT SERPL-MCNC: 0.5 MG/DL (ref 0–1)
BILIRUB SERPL-MCNC: 0.7 MG/DL (ref 0.1–1.5)
BUN SERPL-MCNC: 13 MG/DL (ref 8–22)
BUN SERPL-MCNC: 13 MG/DL (ref 8–22)
CALCIUM SERPL-MCNC: 9.2 MG/DL (ref 8.5–10.5)
CALCIUM SERPL-MCNC: 9.2 MG/DL (ref 8.5–10.5)
CHLORIDE SERPL-SCNC: 105 MMOL/L (ref 96–112)
CHLORIDE SERPL-SCNC: 105 MMOL/L (ref 96–112)
CO2 SERPL-SCNC: 25 MMOL/L (ref 20–33)
CO2 SERPL-SCNC: 25 MMOL/L (ref 20–33)
CREAT SERPL-MCNC: 0.62 MG/DL (ref 0.5–1.4)
CREAT SERPL-MCNC: 0.63 MG/DL (ref 0.5–1.4)
EKG IMPRESSION: NORMAL
GFR SERPLBLD CREATININE-BSD FMLA CKD-EPI: 103 ML/MIN/1.73 M 2
GFR SERPLBLD CREATININE-BSD FMLA CKD-EPI: 103 ML/MIN/1.73 M 2
GLUCOSE SERPL-MCNC: 81 MG/DL (ref 65–99)
GLUCOSE SERPL-MCNC: 83 MG/DL (ref 65–99)
HBV SURFACE AB SERPL IA-ACNC: 31 MIU/ML (ref 0–10)
HBV SURFACE AG SER QL: NORMAL
HCV AB SER QL: NORMAL
MAGNESIUM SERPL-MCNC: 2.3 MG/DL (ref 1.5–2.5)
POTASSIUM SERPL-SCNC: 3.5 MMOL/L (ref 3.6–5.5)
POTASSIUM SERPL-SCNC: 3.7 MMOL/L (ref 3.6–5.5)
PROT SERPL-MCNC: 7.4 G/DL (ref 6–8.2)
SODIUM SERPL-SCNC: 141 MMOL/L (ref 135–145)
SODIUM SERPL-SCNC: 141 MMOL/L (ref 135–145)

## 2025-08-25 PROCEDURE — 80076 HEPATIC FUNCTION PANEL: CPT

## 2025-08-25 PROCEDURE — 87340 HEPATITIS B SURFACE AG IA: CPT

## 2025-08-25 PROCEDURE — 80048 BASIC METABOLIC PNL TOTAL CA: CPT

## 2025-08-25 PROCEDURE — 86803 HEPATITIS C AB TEST: CPT

## 2025-08-25 PROCEDURE — 86706 HEP B SURFACE ANTIBODY: CPT

## 2025-08-25 PROCEDURE — 82784 ASSAY IGA/IGD/IGG/IGM EACH: CPT

## 2025-08-25 PROCEDURE — 82787 IGG 1 2 3 OR 4 EACH: CPT | Mod: 91

## 2025-08-25 PROCEDURE — 36415 COLL VENOUS BLD VENIPUNCTURE: CPT

## 2025-08-25 PROCEDURE — 83735 ASSAY OF MAGNESIUM: CPT

## 2025-08-25 PROCEDURE — 93005 ELECTROCARDIOGRAM TRACING: CPT | Mod: TC | Performed by: INTERNAL MEDICINE

## 2025-08-25 PROCEDURE — 80048 BASIC METABOLIC PNL TOTAL CA: CPT | Mod: 91

## 2025-08-27 LAB
IGA SERPL-MCNC: 228 MG/DL (ref 68–408)
IGG SERPL-MCNC: 1551 MG/DL (ref 768–1632)
IGG1 SER-MCNC: 778 MG/DL (ref 240–1118)
IGG2 SER-MCNC: 664 MG/DL (ref 124–549)
IGG3 SER-MCNC: 35 MG/DL (ref 21–134)
IGG4 SER-MCNC: 83 MG/DL (ref 1–123)
IGM SERPL-MCNC: 52 MG/DL (ref 35–263)

## 2025-08-29 ENCOUNTER — HOSPITAL ENCOUNTER (OUTPATIENT)
Facility: MEDICAL CENTER | Age: 55
End: 2025-08-29
Attending: INTERNAL MEDICINE
Payer: COMMERCIAL

## 2025-08-29 DIAGNOSIS — J47.0 BRONCHIECTASIS WITH ACUTE LOWER RESPIRATORY INFECTION (HCC): ICD-10-CM

## 2025-08-29 LAB — CHLORIDE SWEAT-SCNC: 25 MMOL/L (ref 0–29)

## 2025-08-29 PROCEDURE — 82438 ASSAY OTHER FLUID CHLORIDES: CPT

## 2025-08-29 PROCEDURE — 89230 COLLECT SWEAT FOR TEST: CPT

## 2025-09-16 ENCOUNTER — APPOINTMENT (OUTPATIENT)
Dept: MEDICAL GROUP | Facility: IMAGING CENTER | Age: 55
End: 2025-09-16
Payer: COMMERCIAL

## (undated) DEVICE — NEPTUNE 4 PORT MANIFOLD - (20/PK)

## (undated) DEVICE — ELECTRODE DUAL RETURN W/ CORD - (50/PK)

## (undated) DEVICE — TOWEL STOP TIMEOUT SAFETY FLAG (40EA/CA)

## (undated) DEVICE — SET I.V. EXTENSION DIAL-A- - FLOW REGULATOR (48EA/CA)

## (undated) DEVICE — GLOVE BIOGEL SZ 8 SURGICAL PF LTX - (50PR/BX 4BX/CA)

## (undated) DEVICE — GOWN WARMING STANDARD FLEX - (30/CA)

## (undated) DEVICE — CANNULA O2 COMFORT SOFT EAR ADULT 7 FT TUBING (50/CA)

## (undated) DEVICE — GLOVE, LITE (PAIR)

## (undated) DEVICE — BRONCHOSCOPE 4 LARGE 5.8 DIAMETER WITH 2.8 WORKING CHANNEL (5EA/BX)